# Patient Record
Sex: MALE | Employment: OTHER | URBAN - METROPOLITAN AREA
[De-identification: names, ages, dates, MRNs, and addresses within clinical notes are randomized per-mention and may not be internally consistent; named-entity substitution may affect disease eponyms.]

---

## 2023-08-07 ENCOUNTER — OFFICE VISIT (OUTPATIENT)
Dept: INTERNAL MEDICINE CLINIC | Facility: CLINIC | Age: 68
End: 2023-08-07
Payer: MEDICAID

## 2023-08-07 ENCOUNTER — LAB ENCOUNTER (OUTPATIENT)
Dept: LAB | Facility: REFERENCE LAB | Age: 68
End: 2023-08-07
Attending: FAMILY MEDICINE
Payer: MEDICAID

## 2023-08-07 VITALS
BODY MASS INDEX: 25.23 KG/M2 | TEMPERATURE: 98 F | OXYGEN SATURATION: 99 % | SYSTOLIC BLOOD PRESSURE: 122 MMHG | HEIGHT: 63.78 IN | HEART RATE: 62 BPM | DIASTOLIC BLOOD PRESSURE: 78 MMHG | WEIGHT: 146 LBS

## 2023-08-07 DIAGNOSIS — Z00.00 HEALTHCARE MAINTENANCE: ICD-10-CM

## 2023-08-07 DIAGNOSIS — E78.2 MIXED HYPERLIPIDEMIA: ICD-10-CM

## 2023-08-07 DIAGNOSIS — E11.9 TYPE 2 DIABETES MELLITUS WITHOUT COMPLICATION, WITHOUT LONG-TERM CURRENT USE OF INSULIN (HCC): ICD-10-CM

## 2023-08-07 DIAGNOSIS — R41.3 MEMORY CHANGES: ICD-10-CM

## 2023-08-07 DIAGNOSIS — G40.909 SEIZURE DISORDER (HCC): ICD-10-CM

## 2023-08-07 DIAGNOSIS — Z12.11 COLON CANCER SCREENING: ICD-10-CM

## 2023-08-07 DIAGNOSIS — L29.9 PRURITUS: ICD-10-CM

## 2023-08-07 DIAGNOSIS — I10 ESSENTIAL HYPERTENSION: Primary | ICD-10-CM

## 2023-08-07 DIAGNOSIS — I10 ESSENTIAL HYPERTENSION: ICD-10-CM

## 2023-08-07 DIAGNOSIS — Z12.5 PROSTATE CANCER SCREENING: ICD-10-CM

## 2023-08-07 LAB
ALBUMIN SERPL-MCNC: 3.8 G/DL (ref 3.4–5)
ALBUMIN/GLOB SERPL: 0.9 {RATIO} (ref 1–2)
ALP LIVER SERPL-CCNC: 64 U/L
ALT SERPL-CCNC: 19 U/L
ANION GAP SERPL CALC-SCNC: 5 MMOL/L (ref 0–18)
AST SERPL-CCNC: 10 U/L (ref 15–37)
BASOPHILS # BLD AUTO: 0.03 X10(3) UL (ref 0–0.2)
BASOPHILS NFR BLD AUTO: 0.4 %
BILIRUB SERPL-MCNC: 0.3 MG/DL (ref 0.1–2)
BILIRUB UR QL: NEGATIVE
BUN BLD-MCNC: 21 MG/DL (ref 7–18)
BUN/CREAT SERPL: 17.6 (ref 10–20)
CALCIUM BLD-MCNC: 9.4 MG/DL (ref 8.5–10.1)
CHLORIDE SERPL-SCNC: 107 MMOL/L (ref 98–112)
CHOLEST SERPL-MCNC: 205 MG/DL (ref ?–200)
CLARITY UR: CLEAR
CO2 SERPL-SCNC: 29 MMOL/L (ref 21–32)
COLOR UR: YELLOW
COMPLEXED PSA SERPL-MCNC: 5.24 NG/ML (ref ?–4)
CREAT BLD-MCNC: 1.19 MG/DL
CREAT UR-SCNC: 263 MG/DL
DEPRECATED RDW RBC AUTO: 43.8 FL (ref 35.1–46.3)
EGFRCR SERPLBLD CKD-EPI 2021: 67 ML/MIN/1.73M2 (ref 60–?)
EOSINOPHIL # BLD AUTO: 0.42 X10(3) UL (ref 0–0.7)
EOSINOPHIL NFR BLD AUTO: 5.1 %
ERYTHROCYTE [DISTWIDTH] IN BLOOD BY AUTOMATED COUNT: 12.5 % (ref 11–15)
EST. AVERAGE GLUCOSE BLD GHB EST-MCNC: 217 MG/DL (ref 68–126)
FASTING PATIENT LIPID ANSWER: YES
FASTING STATUS PATIENT QL REPORTED: YES
FOLATE SERPL-MCNC: 17.2 NG/ML (ref 8.7–?)
GLOBULIN PLAS-MCNC: 4.1 G/DL (ref 2.8–4.4)
GLUCOSE BLD-MCNC: 134 MG/DL (ref 70–99)
GLUCOSE UR-MCNC: 30 MG/DL
HBA1C MFR BLD: 9.2 % (ref ?–5.7)
HCT VFR BLD AUTO: 42.3 %
HDLC SERPL-MCNC: 43 MG/DL (ref 40–59)
HGB BLD-MCNC: 13.9 G/DL
HGB UR QL STRIP.AUTO: NEGATIVE
IMM GRANULOCYTES # BLD AUTO: 0.04 X10(3) UL (ref 0–1)
IMM GRANULOCYTES NFR BLD: 0.5 %
KETONES UR-MCNC: NEGATIVE MG/DL
LDLC SERPL CALC-MCNC: 140 MG/DL (ref ?–100)
LEUKOCYTE ESTERASE UR QL STRIP.AUTO: NEGATIVE
LYMPHOCYTES # BLD AUTO: 2.1 X10(3) UL (ref 1–4)
LYMPHOCYTES NFR BLD AUTO: 25.5 %
MCH RBC QN AUTO: 31 PG (ref 26–34)
MCHC RBC AUTO-ENTMCNC: 32.9 G/DL (ref 31–37)
MCV RBC AUTO: 94.4 FL
MICROALBUMIN UR-MCNC: 8.08 MG/DL
MICROALBUMIN/CREAT 24H UR-RTO: 30.7 UG/MG (ref ?–30)
MONOCYTES # BLD AUTO: 0.59 X10(3) UL (ref 0.1–1)
MONOCYTES NFR BLD AUTO: 7.2 %
NEUTROPHILS # BLD AUTO: 5.05 X10 (3) UL (ref 1.5–7.7)
NEUTROPHILS # BLD AUTO: 5.05 X10(3) UL (ref 1.5–7.7)
NEUTROPHILS NFR BLD AUTO: 61.3 %
NITRITE UR QL STRIP.AUTO: NEGATIVE
NONHDLC SERPL-MCNC: 162 MG/DL (ref ?–130)
OSMOLALITY SERPL CALC.SUM OF ELEC: 297 MOSM/KG (ref 275–295)
PH UR: 5 [PH] (ref 5–8)
PLATELET # BLD AUTO: 271 10(3)UL (ref 150–450)
POTASSIUM SERPL-SCNC: 4.5 MMOL/L (ref 3.5–5.1)
PROT SERPL-MCNC: 7.9 G/DL (ref 6.4–8.2)
PROT UR-MCNC: 20 MG/DL
RBC # BLD AUTO: 4.48 X10(6)UL
SODIUM SERPL-SCNC: 141 MMOL/L (ref 136–145)
SP GR UR STRIP: 1.02 (ref 1–1.03)
TRIGL SERPL-MCNC: 124 MG/DL (ref 30–149)
UROBILINOGEN UR STRIP-ACNC: NORMAL
VIT B12 SERPL-MCNC: 292 PG/ML (ref 193–986)
VIT D+METAB SERPL-MCNC: 21.2 NG/ML (ref 30–100)
VLDLC SERPL CALC-MCNC: 23 MG/DL (ref 0–30)
WBC # BLD AUTO: 8.2 X10(3) UL (ref 4–11)

## 2023-08-07 PROCEDURE — 36415 COLL VENOUS BLD VENIPUNCTURE: CPT

## 2023-08-07 PROCEDURE — 80061 LIPID PANEL: CPT

## 2023-08-07 PROCEDURE — 3061F NEG MICROALBUMINURIA REV: CPT | Performed by: INTERNAL MEDICINE

## 2023-08-07 PROCEDURE — 82306 VITAMIN D 25 HYDROXY: CPT

## 2023-08-07 PROCEDURE — 3046F HEMOGLOBIN A1C LEVEL >9.0%: CPT | Performed by: INTERNAL MEDICINE

## 2023-08-07 PROCEDURE — 82607 VITAMIN B-12: CPT

## 2023-08-07 PROCEDURE — 86780 TREPONEMA PALLIDUM: CPT

## 2023-08-07 PROCEDURE — 85025 COMPLETE CBC W/AUTO DIFF WBC: CPT | Performed by: FAMILY MEDICINE

## 2023-08-07 PROCEDURE — 81001 URINALYSIS AUTO W/SCOPE: CPT

## 2023-08-07 PROCEDURE — 80053 COMPREHEN METABOLIC PANEL: CPT

## 2023-08-07 PROCEDURE — 3060F POS MICROALBUMINURIA REV: CPT | Performed by: INTERNAL MEDICINE

## 2023-08-07 PROCEDURE — 80177 DRUG SCRN QUAN LEVETIRACETAM: CPT

## 2023-08-07 PROCEDURE — 82043 UR ALBUMIN QUANTITATIVE: CPT

## 2023-08-07 PROCEDURE — 83036 HEMOGLOBIN GLYCOSYLATED A1C: CPT

## 2023-08-07 PROCEDURE — 82746 ASSAY OF FOLIC ACID SERUM: CPT

## 2023-08-07 PROCEDURE — 82570 ASSAY OF URINE CREATININE: CPT

## 2023-08-07 RX ORDER — GLIPIZIDE 10 MG/1
10 TABLET ORAL
COMMUNITY

## 2023-08-07 RX ORDER — LEVETIRACETAM 500 MG/1
500 TABLET ORAL 2 TIMES DAILY
COMMUNITY

## 2023-08-07 RX ORDER — ENALAPRIL MALEATE 20 MG/1
20 TABLET ORAL DAILY
COMMUNITY

## 2023-08-07 RX ORDER — LEVOCETIRIZINE DIHYDROCHLORIDE 5 MG/1
5 TABLET, FILM COATED ORAL EVERY EVENING
Qty: 14 TABLET | Refills: 0 | Status: SHIPPED | OUTPATIENT
Start: 2023-08-07

## 2023-08-07 RX ORDER — ATORVASTATIN CALCIUM 40 MG/1
40 TABLET, FILM COATED ORAL DAILY
COMMUNITY

## 2023-08-07 RX ORDER — HYDROCHLOROTHIAZIDE 12.5 MG/1
12.5 CAPSULE, GELATIN COATED ORAL DAILY
COMMUNITY

## 2023-08-08 ENCOUNTER — TELEPHONE (OUTPATIENT)
Dept: INTERNAL MEDICINE CLINIC | Facility: CLINIC | Age: 68
End: 2023-08-08

## 2023-08-08 DIAGNOSIS — G40.909 SEIZURE DISORDER (HCC): Primary | ICD-10-CM

## 2023-08-08 NOTE — TELEPHONE ENCOUNTER
[employer] message sent to pt/ family to inform them that not all of the lab test results are final yet, hence Dr Dai Weems cannot report results until all results are available for her review. Advised to call the office back in a week if they have not heard back re: test results by then.

## 2023-08-09 LAB
LEVETIRACETAM LVL: <2 UG/ML
T PALLIDUM AB SER QL: NEGATIVE

## 2023-08-14 ENCOUNTER — TELEPHONE (OUTPATIENT)
Dept: INTERNAL MEDICINE CLINIC | Facility: CLINIC | Age: 68
End: 2023-08-14

## 2023-08-14 NOTE — TELEPHONE ENCOUNTER
Daughter is calling stating the medication Levocetirizine needs a prior authorization       Please advice

## 2023-08-18 NOTE — TELEPHONE ENCOUNTER
I spoke with pt's daughter Romi Agarwal. Reviewed and discussed med is available OTC. No PA needed, can get generic form. Notified other refills have already been sent in. To let us know of any issues. She is agreeable with plan.

## 2023-08-21 NOTE — TELEPHONE ENCOUNTER
Daughter is calling stating patient needs prior approval for all medications except for atovastatin.        Please advice
Spoke to daughter, pharmacy states they just need Pt's new insurance card. They already have 2 of the medications ready for him.
English

## 2023-09-12 ENCOUNTER — HOSPITAL ENCOUNTER (OUTPATIENT)
Dept: GENERAL RADIOLOGY | Facility: HOSPITAL | Age: 68
Discharge: HOME | End: 2023-09-12
Attending: INTERNAL MEDICINE
Payer: MEDICAID

## 2023-09-12 ENCOUNTER — OFFICE VISIT (OUTPATIENT)
Dept: INTERNAL MEDICINE CLINIC | Facility: CLINIC | Age: 68
End: 2023-09-12
Payer: MEDICAID

## 2023-09-12 VITALS
HEART RATE: 71 BPM | WEIGHT: 147 LBS | OXYGEN SATURATION: 98 % | TEMPERATURE: 97 F | SYSTOLIC BLOOD PRESSURE: 146 MMHG | BODY MASS INDEX: 25 KG/M2 | DIASTOLIC BLOOD PRESSURE: 70 MMHG

## 2023-09-12 DIAGNOSIS — B02.9 HERPES ZOSTER WITHOUT COMPLICATION: ICD-10-CM

## 2023-09-12 DIAGNOSIS — M79.604 RIGHT LEG PAIN: Primary | ICD-10-CM

## 2023-09-12 DIAGNOSIS — M79.604 RIGHT LEG PAIN: ICD-10-CM

## 2023-09-12 PROCEDURE — 3078F DIAST BP <80 MM HG: CPT | Performed by: INTERNAL MEDICINE

## 2023-09-12 PROCEDURE — 3077F SYST BP >= 140 MM HG: CPT | Performed by: INTERNAL MEDICINE

## 2023-09-12 PROCEDURE — 99214 OFFICE O/P EST MOD 30 MIN: CPT | Performed by: INTERNAL MEDICINE

## 2023-09-12 PROCEDURE — 73590 X-RAY EXAM OF LOWER LEG: CPT | Performed by: INTERNAL MEDICINE

## 2023-09-12 RX ORDER — TRAMADOL HYDROCHLORIDE 50 MG/1
50 TABLET ORAL EVERY 6 HOURS PRN
Qty: 30 TABLET | Refills: 1 | Status: SHIPPED | OUTPATIENT
Start: 2023-09-12

## 2023-09-12 RX ORDER — ACYCLOVIR 800 MG/1
800 TABLET ORAL 4 TIMES DAILY
Qty: 28 TABLET | Refills: 0 | Status: SHIPPED | OUTPATIENT
Start: 2023-09-12

## 2023-09-15 NOTE — TELEPHONE ENCOUNTER
I spoke with pt's daughter. Said pt has been compliant with acyclovir and has been taking tramadol at least 2 times daily. Tramadol helps manage pain for a couple hours, then pt reports pain comes back. He is taking when pain is \"unbearable\". Yobany Sanders reports pain is stable - R leg, below knee. Reports pt is c/o that he is having pain \"a little bit\" above knee. Rash is not spreading but reports pain is where rash is at. Believes pt is able to sleep. Reviewed and discussed PVD - progressive over time. Has been asymptomatic. Denies leg pain prior to shingles onset. Discussed can be in other arteries, should f/u with Dr. Neena Mcqueen. Confirmed with Dr. Kari Rojo, ok to increase tramadol to 2 tabs q 8 hrs. I spoke with Yobany Sanders, notified of med change. Again reviewed pt has PVD. Yobany Sanders reports pt has sun and gets exhausted with exertion. Ongoing since the summer, has to stop activity. Per spouse pt turned pale once and had to sit for extended period of time. Unable to walk a whole block, was previously able to. Symptoms go back to baseline at rest. Denies edema. I asked if pt checks bp at home. Yobany Sanders said SBP was 180. I advised to have pt check bp daily or every other day and keep log. To call back mid next week with bp update with readings. Pt scheduled for f/u 10/10/23. Aware I will send message to MD. We will f/u with any further recommendations prior to appt. ED precautions discussed.

## 2023-09-15 NOTE — TELEPHONE ENCOUNTER
Daughter is calling asking xray results. She states patient continues having left leg pain. She states the medication that was given is not working. Daughter states he is fine for two hours and then starts complaining again about the pain.        Please advice

## 2023-09-18 NOTE — TELEPHONE ENCOUNTER
SEWELL was not endorsed at last OV. Has upcoming appt on 10/10 however we can move up to an earlier SDA. Please provide ER precautions.  Let me know the BP log

## 2023-09-20 NOTE — TELEPHONE ENCOUNTER
Patients daughter  is requesting an appeal, patient's insurance is only authorizing 4 prescriptions per month, please advise

## 2023-09-25 NOTE — TELEPHONE ENCOUNTER
Detailed messages ( english/ Uzbek) sent to patients daughter via [employer] explaining the 4 drug limit policy within the traditional [employer] plan and option she an take to change to managed medicaid plan to avoid all the PA  form completion/ delay issues likely to be encountered month after month given patient has 8 Rxs, 4 over the limit. Also explained that some of her issues ( per [facility], was trying to refill Rxs too soon) and then that not all Rxs were being filled at same pharmacy. Advised that when an Rx is specifically denied a refill for the 4 drug limit, she needs to request the pharmacy to send a separate PA form for each denied drug to Dr Spann Rule office. Triage team will await faxed receipt of PA forms when/ if necessary.

## 2023-09-29 NOTE — PATIENT INSTRUCTIONS
Recommendations:  - call to schedule CT of abdomen and pelvis  - work on incorporating meals throughout the day   - get cardiac testing prior to colonoscopy      1. Schedule colonoscopy with Dr. Elsa Owen or Dr. Ladonna Blair with [facility] [Diagnosis: crc screening, weight loss]    2.  bowel prep from pharmacy (split golytely)    3. Hold metformin and glipizide day before and day of procedure. 4. Read all bowel prep instructions carefully. Bowel prep instructions can also be found online at:  www.[facility].org/giprep     5. AVOID seeds, nuts, popcorn, raw fruits and vegetables for 3 days before procedure    6. You MAY need to go for COVID testing 72 hours before procedure. The testing team will call you a few days before your procedure to discuss with you if testing is required. If you are asked to go for COVID testing and do not completed the test, the procedure cannot be performed. 7. If you start any NEW medication after your visit today, please notify us. Certain medications (like iron or weight loss medications) will need to be held before the procedure, or the procedure cannot be performed safely.

## 2023-09-29 NOTE — H&P
[facility], [facility] - Gastroenterology                                                                                                               Reason for consult: Patient presents with:  Consult: Colonoscopy      Requesting physician or provider: Savannah Ladd MD      HPI:   Sommer Mcqueen is a 76year old year-old male with history of HTN, HLD, DM, seizure who presents for crc screening. he moves his bowels 3  times per day and without recent change. He states stools can be extremely soft over the past month. denies straining and/or incomplete evacuation. he denies brbpr and/or melena. He notes decreased appetite over the last two months. Weight loss in last 2 months. He is unsure for the cause of symptoms, he notes no abdominal pain or GERD. he denies dysphagia, odynophagia and/or globus. he denies abdominal pain. he denies nausea and/or vomiting. he denies bloating. NSAIDS: PRN  Tobacco: none  Alcohol: none  Marijuana: none  Illicit drugs: none    FH GI malignancy- pancreatic cancer sister   FH celiac dz- none  FH liver dz- none  FH IBD- none    No history of adverse reaction to sedation  No KELLY  No anticoagulants  No pacemaker/defibrillator  No pain medications and/or sleep aides      Last colonoscopy: none  Last EGD: none    Wt Readings from Last 6 Encounters:  09/29/23 : 142 lb (64.4 kg)  09/29/23 : 142 lb (64.4 kg)  09/19/23 : 143 lb (64.9 kg)  09/12/23 : 147 lb (66.7 kg)  08/07/23 : 146 lb (66.2 kg)       History, Medications, Allergies, ROS:      Past Medical History:   Diagnosis Date    Diabetes ([facility] [address]     Essential hypertension     High cholesterol     Seizure ([facility] [address]       History reviewed. No pertinent surgical history.    Family Hx:   Family History   Problem Relation Age of Onset    Diabetes Mother     Diabetes Sister     Pancreatic Cancer Sister     Diabetes Brother     Blood Cancer Sister       Social History:   Social History     Socioeconomic History    Marital status:    Tobacco Use    Smoking status: Never    Smokeless tobacco: Never   Substance and Sexual Activity    Alcohol use: Not Currently    Drug use: Never        Medications (Active prior to today's visit):  Current Outpatient Medications   Medication Sig Dispense Refill    Cholecalciferol (VITAMIN D) 50 MCG (2000 UT) Oral Cap Take by mouth.      gabapentin 100 MG Oral Cap Take 1 capsule (100 mg total) by mouth 2 (two) times daily as needed (nerve pain). 60 capsule 0    atorvastatin 40 MG Oral Tab Take 1 tablet (40 mg total) by mouth daily. 90 tablet 1    enalapril 20 MG Oral Tab Take 1 tablet (20 mg total) by mouth daily. 90 tablet 1    glipiZIDE 10 MG Oral Tab Take 1 tablet (10 mg total) by mouth 2 (two) times daily before meals. 180 tablet 1    hydroCHLOROthiazide 12.5 MG Oral Cap Take 1 capsule (12.5 mg total) by mouth daily. 90 capsule 1    metFORMIN HCl 1000 MG Oral Tab Take 1 tablet (1,000 mg total) by mouth 2 (two) times daily with meals. 180 tablet 1    levETIRAcetam 500 MG Oral Tab Take 1 tablet (500 mg total) by mouth 2 (two) times daily. 180 tablet 3    acyclovir 800 MG Oral Tab Take 1 tablet (800 mg total) by mouth 4 (four) times daily. (Patient not taking: Reported on 9/29/2023) 28 tablet 0    traMADol 50 MG Oral Tab Take 1 tablet (50 mg total) by mouth every 6 (six) hours as needed for Pain.  (Patient not taking: Reported on 9/29/2023) 30 tablet 1       Allergies:  No Known Allergies    ROS:   CONSTITUTIONAL: negative for fevers, chills, sweats and weight loss  EYES Negative for red eyes, yellow eyes, changes in vision  HEENT: Negative for dysphagia and hoarseness  RESPIRATORY: Negative for cough and shortness of breath  CARDIOVASCULAR: Negative for chest pain, palpitations  GASTROINTESTINAL: See HPI  GENITOURINARY: Negative for dysuria and frequency  MUSCULOSKELETAL: Negative for arthralgias and myalgias  NEUROLOGICAL: Negative for dizziness and headaches  BEHAVIOR/PSYCH: Negative for anxiety and poor appetite    PHYSICAL EXAM:   Blood pressure 152/69, pulse 76, height 5' 3\" (1.6 m), weight 142 lb (64.4 kg). GEN: WD/WN, NAD  HEENT: Supple symmetrical, trachea midline  CV: RRR, the extremities are warm and well perfused   LUNGS: No increased work of breathing  ABDOMEN: No scars, normal bowel sounds, soft, non-tender, non-distended no rebound or guarding, no masses, no hepatomegaly  MSK: No redness, no warmth, no swelling of joints  SKIN: No jaundice, no erythema, no rashes  HEMATOLOGIC: No bleeding, no bruising  NEURO: Alert and interactive, normal gait    Labs/Imaging/Procedures:     Patient's pertinent labs and imaging were reviewed and discussed with patient today. Lab Results   Component Value Date    WBC 8.2 08/07/2023    RBC 4.48 08/07/2023    HGB 13.9 08/07/2023    HCT 42.3 08/07/2023    MCV 94.4 08/07/2023    MCH 31.0 08/07/2023    MCHC 32.9 08/07/2023    RDW 12.5 08/07/2023    .0 08/07/2023        Lab Results   Component Value Date     (H) 08/07/2023    BUN 21 (H) 08/07/2023    BUNCREA 17.6 08/07/2023    CREATSERUM 1.19 08/07/2023    ANIONGAP 5 08/07/2023    CA 9.4 08/07/2023    OSMOCALC 297 (H) 08/07/2023    ALKPHO 64 08/07/2023    AST 10 (L) 08/07/2023    ALT 19 08/07/2023    BILT 0.3 08/07/2023    TP 7.9 08/07/2023    ALB 3.8 08/07/2023    GLOBULIN 4.1 08/07/2023     08/07/2023    K 4.5 08/07/2023     08/07/2023    CO2 29.0 08/07/2023          . ASSESSMENT/PLAN:   William Whitmore is a 76year old year-old male with history of HTN, HLD, DM, seizure who presents for crc screening. #crc screening  Patient presents for crc screening. Bowel movements daily. Often super soft. No recent change. No brbpr or melena. Eating small amount of food throughout the day due to lack of appetite. He denies abdominal pain, GERD, nausea and vomiting. Sister history of pancreatic cancer. No family hx of colon cancer. Discussed need for cln at this time. Due to weight loss, decreased appetite and family hx of pancreatic cancer plan for ct a/p to evaluate for pancreatic cause. Most recent CMP within normal. No anemia noted on labs. No tenderness on exam.     Patient agreeable to plan. Recommendations:  - call to schedule CT of abdomen and pelvis  - work on incorporating meals throughout the day   - get cardiac testing prior to colonoscopy      1. Schedule colonoscopy with Dr. Elsa Owen or Dr. Ladonna Blair with [facility] [Diagnosis: crc screening, weight loss]    2.  bowel prep from pharmacy (split golytely)    3. Hold metformin and glipizide day before and day of procedure. 4. Read all bowel prep instructions carefully. Bowel prep instructions can also be found online at:  [email or web address]     5. AVOID seeds, nuts, popcorn, raw fruits and vegetables for 3 days before procedure    6. You MAY need to go for COVID testing 72 hours before procedure. The testing team will call you a few days before your procedure to discuss with you if testing is required. If you are asked to go for COVID testing and do not completed the test, the procedure cannot be performed. 7. If you start any NEW medication after your visit today, please notify us. Certain medications (like iron or weight loss medications) will need to be held before the procedure, or the procedure cannot be performed safely. Orders This Visit:  No orders of the defined types were placed in this encounter. Meds This Visit:  Requested Prescriptions      No prescriptions requested or ordered in this encounter       Imaging & Referrals:  CT ABDOMEN+PELVIS(ALL W+WO)(CPT=74178)    ENDOSCOPIC RISK BENEFIT DISCUSSION: I described the procedure in great detail with the patient. I discussed the risks and benefits, including but not limited to: bleeding, perforation, infection, anesthesia complications, and even death.  Patient will be NPO after midnight and will have a person physically present at time of pick-up to drive patient home. Patient verbalized understanding and agrees to proceed with procedure as planned. Pan Guidry PA-C   9/29/2023        This note was partially prepared using "[employer] voice recognition dictation software. As a result, errors may occur. When identified, these errors have been corrected.  While every attempt is made to correct errors during dictation, discrepancies may still exist.

## 2023-09-29 NOTE — TELEPHONE ENCOUNTER
Scheduled for:  Colonoscopy 40963/[id number]  Provider Name:  Dr Toney Valiente  Date:  412/2024  Location:  [facility]  Sedation:  [facility]  Time:  8:15 am. (pt is aware to arrive at 7:15 am)  Prep:  Split dose Golytely  Meds/Allergies Reconciled?:  CLARISSA Doll reviewed  Diagnosis with codes:    CRC screening Z12.11  Weight loss R63.4  Was patient informed to call insurance with codes (Y/N):  Yes  Referral sent?:  Referral was sent at the time of electronic surgical scheduling. [address] [facility] or [facility]  notified?:  I sent an electronic request to Endo Scheduling and received a confirmation today. Medication Orders:  Pt is aware to NOT take iron pills, herbal meds and diet supplements for 7 days before exam.   Hold metformin and glipizide day before and day of procedure   Also to NOT take any form of alcohol, recreational drugs and any forms of ED meds 24 hours before exam.   Misc Orders:  N/A   Further instructions given by staff:  I discussed the prep instructions with the patient which he verbally understood. I provided patient with prep instruction's sheet in office. Patient was informed about the new cancellation policy for his/her procedure. Patient was also given a copy of the cancellation policy at the time of the appointment and verbalized understanding.

## 2023-09-29 NOTE — PROGRESS NOTES
Neurology Initial Visit     Referred By: Dr. Deshpande ref. provider found    Chief Complaint: Patient presents with:  Neurologic Problem: New patient presents with seizure disorders. Patient stop taking Keppra for one week because while he was taking Gabapentin it was making him feel fatigue, dizzy and fainting sensation. Since he hasn't been taking Keppra he feels a lot better Patient wife states that the Venora Cobble makes him sleep an additional 5 hours in the day because  he is tired. HPI:     Ana Rausch is a 76year old male, who presents for history of seizure. Back in 2021 patient had a syncopal convulsive event. He was admitted to outside hospital, apparently EEG was done and that showed left temporal sharp wave activity, CT of the head reportedly also was done and that was not revealing. Patient was placed on Keppra. He was tolerating it well, no more events. But apparently the dose was slowly decreased. They switched care to our clinic in September 2023 since it was difficult to get a hold of those neurologist.  Patient also was describing a lot of snoring and hypersomnia during daytime. Additionally some cognitive changes, forgetting of recent events conversations. He is not working, he would have mistakes while driving. Also history of shingles rash and pain. Some itchiness over the scalp. .     Past Medical History:   Diagnosis Date    Diabetes ([facility])     Essential hypertension     High cholesterol     Seizure ([facility])        No past surgical history on file. Social history:    Smoking status:   Never      Smokeless tobacco:   Never       Alcohol use   Not Currently       Drug use:   Unknown       Family History   Problem Relation Age of Onset    Diabetes Mother     Diabetes Sister     Pancreatic Cancer Sister     Diabetes Brother     Blood Cancer Sister          Current Outpatient Medications:     levETIRAcetam 500 MG Oral Tab, Take 1 tablet (500 mg total) by mouth 2 (two) times daily. , Disp: 180 tablet, Rfl: 3    Cholecalciferol (VITAMIN D) 50 MCG (2000 UT) Oral Cap, Take by mouth., Disp: , Rfl:     gabapentin 100 MG Oral Cap, Take 1 capsule (100 mg total) by mouth 2 (two) times daily as needed (nerve pain). , Disp: 60 capsule, Rfl: 0    acyclovir 800 MG Oral Tab, Take 1 tablet (800 mg total) by mouth 4 (four) times daily. (Patient not taking: Reported on 9/29/2023), Disp: 28 tablet, Rfl: 0    traMADol 50 MG Oral Tab, Take 1 tablet (50 mg total) by mouth every 6 (six) hours as needed for Pain. (Patient not taking: Reported on 9/29/2023), Disp: 30 tablet, Rfl: 1    atorvastatin 40 MG Oral Tab, Take 1 tablet (40 mg total) by mouth daily. , Disp: 90 tablet, Rfl: 1    enalapril 20 MG Oral Tab, Take 1 tablet (20 mg total) by mouth daily. , Disp: 90 tablet, Rfl: 1    glipiZIDE 10 MG Oral Tab, Take 1 tablet (10 mg total) by mouth 2 (two) times daily before meals. , Disp: 180 tablet, Rfl: 1    hydroCHLOROthiazide 12.5 MG Oral Cap, Take 1 capsule (12.5 mg total) by mouth daily. , Disp: 90 capsule, Rfl: 1    metFORMIN HCl 1000 MG Oral Tab, Take 1 tablet (1,000 mg total) by mouth 2 (two) times daily with meals. , Disp: 180 tablet, Rfl: 1    No Known Allergies    ROS:   As in HPI, the rest of the 14 system review was done and was negative      Physical Exam:   09/29/23  0956   Weight: 142 lb (64.4 kg)   Height: 63\"       General: No apparent distress, well nourished, well groomed. Head- Normocephalic, atraumatic  Eyes- No redness or swelling  ENT- Hearing intake, normal glutition  Neck- No masses or adenopathy  Cv: pulses were palpable and normal, no cyanosis or edema     Neurological:     Mental Status- Alert unable to assess fully due to language barrier.     Rapid alternating movements intact    Gait:  Normal posture  Normal physiologic      Labs:    No results found for: \"TSH\"  Lab Results   Component Value Date    HDL 43 08/07/2023     (H) 08/07/2023    TRIG 124 08/07/2023     Lab Results   Component Value Date    HGB 13.9 08/07/2023    HCT 42.3 08/07/2023    MCV 94.4 08/07/2023    WBC 8.2 08/07/2023    .0 08/07/2023      Lab Results   Component Value Date    BUN 21 (H) 08/07/2023    CA 9.4 08/07/2023    ALT 19 08/07/2023    AST 10 (L) 08/07/2023    ALB 3.8 08/07/2023     08/07/2023    K 4.5 08/07/2023     08/07/2023    CO2 29.0 08/07/2023      I have reviewed labs. I have reviewed prior records from the outside hospital      Assessment   1. Seizure disorder ([address]  Episode of 1 episode of seizure, with abnormal EEG report with left temporal sharp wave activity. Unclear if it is anything had anything to do with her COVID vaccination. Possible epilepsy. We will do MRI since it is not clear if MRI was ever done. EEG will be repeated. The meantime Keppra will be continued since there was already abnormal EEG report. - MRI BRAIN (W+WO) (CPT=70553); Future  - EEG (At [facility])    2. Hypersomnia  Some of his hypersomnia is probably from the obstructive sleep apnea, sleep study will be done. - DIAGOSTIC SLEEP STUDY  - General sleep study; Future    3. MCI (mild cognitive impairment) with memory loss  Cognitive impairment with amnestic subtype, further work-up will be initiated including neuropsychological testing.    - levETIRAcetam 500 MG Oral Tab; Take 1 tablet (500 mg total) by mouth 2 (two) times daily. Dispense: 180 tablet; Refill: 3  - MRI BRAIN (W+WO) (CPT=70553); Future  - EEG (At [facility] sleep study; Future  - NEURO - EXTERNAL  - NEURO - EXTERNAL  -  NAVIGATOR  - T Pallidum Screening [place]; Future  - TSH W Reflex To Free T4; Future  - Vitamin B12; Future           Education and counseling provided to patient. Instructed patient to call my office or seek medical attention immediately if symptoms worsen. Patient verbalized understanding of information given. All questions were answered.  All side effects of drugs were discussed. Return to clinic in: Return in about 3 months (around 12/29/2023).     Jcarlos Scott MD

## 2023-10-02 NOTE — TELEPHONE ENCOUNTER
----- Message from Cleavon Dubin, MD sent at 9/30/2023  7:43 AM CDT -----  Please let the patient know that results of these particular lab tests so far were normal.    Thank you      Vitamin B12   TSH W Reflex To Free T4

## 2023-10-16 NOTE — TELEPHONE ENCOUNTER
Spoke with daughter Michelle Jett and relayed PCP Message. Daughter states that she will follow up with GI regarding CT results.

## 2023-10-16 NOTE — TELEPHONE ENCOUNTER
----- Message from Christina Snider MD sent at 10/16/2023 12:50 PM CDT -----  Please let patient know that EEG was normal.

## 2023-10-16 NOTE — PROCEDURES
EEG report    REFERRING PHYSICIAN: Cullen Downey MD    PCP and phone number:  Dalton Perez MD  [phone]    TECHNIQUE: 21 channels of EEG, 2 channels of EOG, and 1 channel of EKG were recorded utilizing the International 10/20 System. The recording was performed in a digitized monopolar referential format and playback was reformatted into various referential and bipolar montages utilizing appropriate filter settings. Automatic seizure and spike detection programs were utilized throughout the recording. Video was recorded during the study    CLINICAL DATA:  Patient is sent for the evaluation of possible seizures. MEDICATION:  Continuous Medications:      Scheduled Medications:    Current Outpatient Medications:     Cholecalciferol (VITAMIN D) 50 MCG (2000 UT) Oral Cap, Take by mouth., Disp: , Rfl:     levETIRAcetam 500 MG Oral Tab, Take 1 tablet (500 mg total) by mouth 2 (two) times daily. , Disp: 180 tablet, Rfl: 3    polyethylene glycol, PEG 3350-KCl-NaBcb-NaCl-NaSulf, 236 g Oral Recon Soln, Take 4,000 mL by mouth As Directed. Take 2,000 mL the night before your procedure and 2,000 mL the morning of your procedure., Disp: 1 each, Rfl: 0    gabapentin 100 MG Oral Cap, Take 1 capsule (100 mg total) by mouth 2 (two) times daily as needed (nerve pain). , Disp: 60 capsule, Rfl: 0    acyclovir 800 MG Oral Tab, Take 1 tablet (800 mg total) by mouth 4 (four) times daily. (Patient not taking: Reported on 9/29/2023), Disp: 28 tablet, Rfl: 0    traMADol 50 MG Oral Tab, Take 1 tablet (50 mg total) by mouth every 6 (six) hours as needed for Pain. (Patient not taking: Reported on 9/29/2023), Disp: 30 tablet, Rfl: 1    atorvastatin 40 MG Oral Tab, Take 1 tablet (40 mg total) by mouth daily. , Disp: 90 tablet, Rfl: 1    enalapril 20 MG Oral Tab, Take 1 tablet (20 mg total) by mouth daily. , Disp: 90 tablet, Rfl: 1    glipiZIDE 10 MG Oral Tab, Take 1 tablet (10 mg total) by mouth 2 (two) times daily before meals. , Disp: 180 tablet, Rfl: 1    hydroCHLOROthiazide 12.5 MG Oral Cap, Take 1 capsule (12.5 mg total) by mouth daily. , Disp: 90 capsule, Rfl: 1    metFORMIN HCl 1000 MG Oral Tab, Take 1 tablet (1,000 mg total) by mouth 2 (two) times daily with meals. , Disp: 180 tablet, Rfl: 1    PRN Medications:      ACTIVATION:  Hyperventilation: Not done  Photic stimulation: Done, no abnormalities  Sleep: Normal sleep architecture was seen. BACKGROUND  While the patient was awake, the posterior dominant rhythm consisted of well-regulated 9-10 Hz rhythmic waveforms, symmetrically distributed over both posterior quadrants and was reactive to eye opening. EEG ABNORMALITY  None    IMPRESSION:  This is a normal EEG. No focal, lateralized, or epileptiform features are noted. Clinical correlation required.

## 2023-10-17 NOTE — TELEPHONE ENCOUNTER
Called and talked with patient's daughter about test results. Abnormal kidney lesion noted on CT advised for ultrasound to better evaluate the lesion. Rickie Canard bladder also contracted in CT, will have ultrasound look at this as well. Pancreas normal. No other acute abdominal findings. Patient's daughter states that he is still not eating and continues to lose weight. Discussed small frequent meals and repeat imaging at this time. EGD/cln not until April. Patient to schedule follow up with me.      Sanya Lux PA-C

## 2023-10-19 NOTE — TELEPHONE ENCOUNTER
Daughter of pt is calling requesting a referral for urology. Did not find anything in kidney and need to find out if is cancer.

## 2023-10-19 NOTE — TELEPHONE ENCOUNTER
Called and talked with daughter. Confirmed patient's .     Ultrasound demonstrated a lesion on kidney with concern for renal cell carcinoma. Reviewed with daughter will need further work up and I will send message to PCP to assist with getting him set up with Urology.     Gall stones noted on exam, but negative don sign. Discussed monitoring for worsening pain and to contact the office.         Dr. Lobo Ashley- Patient with renal mass suspicious for renal cell carcinoma on CT and ultrasound. Wanted to see if you could assist with a urology referral at this time.     Thank you     Katja

## 2023-10-19 NOTE — TELEPHONE ENCOUNTER
Ultrasound results suggest patient follows up with Urology.  Let me know if referral can be placed, thanks

## 2023-10-19 NOTE — TELEPHONE ENCOUNTER
Impression   CONCLUSION:  There is a 2.3 cm partially exophytic mass of the lateral left mid kidney, corresponding to a soft tissue density mass on CT. This finding is concerning for a renal cell carcinoma. Recommend urology consultation/follow-up. Multiple bilateral renal simple and minimally complicated cysts elsewhere. Nonobstructing 4 mm left lower pole renal stone. Cholelithiasis.           Dictated by (CST): Rajeev Dyer MD on 10/19/2023 at 9:25 AM      Finalized by (CST): Rajeev Dyer MD on 10/19/2023 at 9:37 AM

## 2023-10-21 NOTE — TELEPHONE ENCOUNTER
Order for urology placed.  Please let family know that if anyble to get appt with any of the providers in the next 2wks to notify our office in order to assist

## 2023-10-30 NOTE — DISCHARGE INSTRUCTIONS
Please discuss your diabetes medications with your physician as they likely will need to be decreased. Please ensure adequate nutrition intake while on these medications.

## 2023-10-30 NOTE — ED INITIAL ASSESSMENT (HPI)
Pt presents to ED for dizziness and a posterior headache that he woke up with today around 0930. Denies N/V. States he feels better now. Tylenol taken at 10am.     After triage completed pt wife reports that the pt started feeling weak, disorientation and not making sense that started at midnight. Wife states the last time she saw him normal was at 7pm last night. Stroke alert called in triage at 1118.

## 2023-11-01 NOTE — TELEPHONE ENCOUNTER
Daughter calling states can't get MRI until 12/4 asking can MD do anything to get it done sooner then that because pt is to return on 11/22 for his f/u please advise

## 2023-11-01 NOTE — TELEPHONE ENCOUNTER
Please advise if ok to wait for patient to wait for first available for MRI of abdomen/pelvis.      Future Appointments   Date Time Provider Cortez Lopez   11/11/2023 12:30 PM [address] [place] [address] [facility]T [facility]   11/16/2023 10:00 AM Loyd Snider MD [facility]   11/22/2023  9:45 AM Lisa Norris MD [facility] OF THE [facility]   12/4/2023  3:15 PM ADO MRI RM1 (1.5T) ADO MRI EM [place]   12/21/2023  9:45 AM Balaji Mendieta MD [facility] OF THE [facility]   1/2/2024  2:45 PM Mirela Vann MD [address] [place] [address] OF THE [facility]   4/12/2024  8:15 AM [facility] None

## 2023-11-01 NOTE — PROGRESS NOTES
Kirby Mckeon MD  Department of Urology  [facility], [place]    T: [phone]  F: [phone]    To: Harry Lopez MD   [facility] [address]    Re: Angeles Castro   MRN: [id number]  : 1955    Dear Harry Lopez MD,    Today I had the pleasure of seeing Angeles Castro in my clinic. As you know, Mr. Aparna Davis is a pleasant 76year old year old male who I am seeing for renal mass. Patient was last seen in this department on Visit date not found. Briefly, patient recently went to the emergency room with dizziness and headache. She had a CT abdomen pelvis on 10/14/2023 for decreased appetite. A 2.8 cm left interpolar renal lesion and a 1.7 cm left upper pole renal lesion and a right lower pole renal lesion measuring 56 Hounsfield units was seen. They are incompletely characterized. Follow-up ultrasound was obtained that showed a cyst in the left lower kidney and a cyst in the upper left pole but a 2.3 cm exophytic lesion of the mid kidney that is concerning for malignancy. PAST MEDICAL HISTORY:  Past Medical History:   Diagnosis Date    Diabetes ([facility]     Essential hypertension     High cholesterol     Seizure ([facility]         PAST SURGICAL HISTORY:  No past surgical history on file.       ALLERGIES:  No Known Allergies      MEDICATIONS:  Current Outpatient Medications   Medication Instructions    acyclovir (ZOVIRAX) 800 mg, Oral, 4 times daily    atorvastatin (LIPITOR) 40 mg, Oral, Daily    Cholecalciferol (VITAMIN D) 50 MCG ( UT) Oral Cap Oral    enalapril (VASOTEC) 20 mg, Oral, Daily    gabapentin (NEURONTIN) 100 mg, Oral, 2 times daily PRN    glipiZIDE (GLUCOTROL) 10 mg, Oral, 2 times daily before meals    hydroCHLOROthiazide (MICROZIDE) 12.5 mg, Oral, Daily    levETIRAcetam (KEPPRA) 500 mg, Oral, 2 times daily    metFORMIN HCl (GLUCOPHAGE) 1,000 mg, Oral, 2 times daily with meals polyethylene glycol, PEG 3350-KCl-NaBcb-NaCl-NaSulf, 236 g Oral Recon Soln 4,000 mL, Oral, As Directed, Take 2,000 mL the night before your procedure and 2,000 mL the morning of your procedure. traMADol (ULTRAM) 50 mg, Oral, Every 6 hours PRN        FAMILY HISTORY:  Family History   Problem Relation Age of Onset    Diabetes Mother     Diabetes Sister     Pancreatic Cancer Sister     Diabetes Brother     Blood Cancer Sister         SOCIAL HISTORY:  Social History     Socioeconomic History    Marital status:    Tobacco Use    Smoking status: Never    Smokeless tobacco: Never   Substance and Sexual Activity    Alcohol use: Not Currently    Drug use: Never          PHYSICAL EXAMINATION:  There were no vitals filed for this visit. CONSTITUTIONAL: No apparent distress, cooperative and communicative  NEUROLOGIC: Alert and oriented   HEAD: Normocephalic, atraumatic   EYES: Sclera non-icteric   ENT: Hearing intact, moist mucous membranes   NECK: No obvious goiter or masses   RESPIRATORY: Normal respiratory effort, Nonlabored breathing on room air  SKIN: No evident rashes   ABDOMEN: Soft, nontender, nondistended, no rebound tenderness, no guarding, no masses      REVIEW OF SYSTEMS:    A comprehensive 10-point review of systems was completed. Pertinent positives and negatives are noted in the the HPI.        LABORATORY DATA:  Glucose  70 - 99 mg/dL 43 Low Panic    Sodium  136 - 145 mmol/L 142   Potassium  3.5 - 5.1 mmol/L 4.4   Chloride  98 - 112 mmol/L 108   CO2  21.0 - 32.0 mmol/L 28.0   Anion Gap  0 - 18 mmol/L 6   BUN  7 - 18 mg/dL 25 High    Creatinine  0.70 - 1.30 mg/dL 1.04   BUN/CREA Ratio  10.0 - 20.0 24.0 High    Calcium, Total  8.5 - 10.1 mg/dL 9.5   Calculated Osmolality  275 - 295 mOsm/kg 295   eGFR-Cr  >=60 mL/min/1.73m2 78   ALT  16 - 61 U/L 17   AST  15 - 37 U/L 13 Low    Alkaline Phosphatase  45 - 117 U/L 49   Bilirubin, Total  0.1 - 2.0 mg/dL 0.4   Total Protein  6.4 - 8.2 g/dL 7.6   Albumin  3.4 - 5.0 g/dL 3.7   Globulin  2.8 - 4.4 g/dL 3.9   A/G Ratio  1.0 - 2.0 0.9 Low      WBC  4.0 - 11.0 x10(3) uL 9.3   RBC  3.80 - 5.80 x10(6)uL 4.03   HGB  13.0 - 17.5 g/dL 12.8 Low    HCT  39.0 - 53.0 % 37.6 Low    MCV  80.0 - 100.0 fL 93.3   MCH  26.0 - 34.0 pg 31.8   MCHC  31.0 - 37.0 g/dL 34.0   RDW-SD  35.1 - 46.3 fL 45.5   RDW  11.0 - 15.0 % 13.2   PLT  150.0 - 450.0 10(3)uL 292.0   Neutrophil Absolute Prelim  1.50 - 7.70 x10 (3) uL 6.31   Neutrophil Absolute  1.50 - 7.70 x10(3) uL 6.31   Lymphocyte Absolute  1.00 - 4.00 x10(3) uL 2.03   Monocyte Absolute  0.10 - 1.00 x10(3) uL 0.58   Eosinophil Absolute  0.00 - 0.70 x10(3) uL 0.29   Basophil Absolute  0.00 - 0.20 x10(3) uL 0.03   Immature Granulocyte Absolute  0.00 - 1.00 x10(3) uL 0.04   Neutrophil %  % 68.0         Component  Ref Range & Units 8/7/23 12:16 PM   Urine Color  Yellow Yellow   Clarity Urine  Clear Clear   Spec Gravity  1.005 - 1.030 1.024   Glucose Urine  Normal mg/dL 30 Abnormal    Bilirubin Urine  Negative Negative   Ketones Urine  Negative mg/dL Negative   Blood Urine  Negative Negative   pH Urine  5.0 - 8.0 5.0   Protein Urine  Negative, Trace mg/dL 20 Abnormal    Urobilinogen Urine  Normal Normal   Nitrite Urine  Negative Negative   Leukocyte Esterase Urine  Negative Negative   WBC Urine  0 - 5 /HPF 1-5   RBC Urine  0 - 2 /HPF 0-2   Bacteria Urine  None Seen /HPF Rare Abnormal    Squamous Epi. Cells  None Seen /HPF Few Abnormal          Component  Ref Range & Units 8/7/23 12:16 PM   Prostate Specific Antigen Screen  <=4.00 ng/mL 5.24 High            IMAGING REVIEW:  Narrative   PROCEDURE: CT ABDOMEN + PELVIS (CONTRAST ONLY) (CPT=74177)     COMPARISON: None. INDICATIONS: R63.0 Decreased appetite R63.4 Weight loss and diarrhea, denies any pain. TECHNIQUE: CT images of the abdomen and pelvis were obtained with non-ionic intravenous contrast material.  Automated exposure control for dose reduction was used.  Adjustment of the mA and/or kV was done based on the patient's size. Use of iterative  reconstruction technique for dose reduction was used. Dose information is transmitted to the [facility]) NRDR ([address]) which includes the Dose Index Registry. FINDINGS:  Evaluation is slightly degraded by patient-related motion artifact. LIVER: Normal.  BILIARY: Contracted gallbladder with probable gallstones. No evidence for cholecystitis or biliary dilatation. SPLEEN: Normal.    PANCREAS: Normal.    ADRENALS: Normal.  KIDNEYS: A 2.8 cm lateral left interpolar renal lesion measuring between 60 and 69 Hounsfield units and a 1.7 cm left upper pole renal lesion which measures between 60 and 72 Hounsfield units. A exophytic right lower pole renal lesion measuring 56  Hounsfield units. A simple 2.6 cm left upper pole renal cyst, a simple exophytic 2 cm left lower pole renal cyst and few additional simple smaller renal cysts. Right upper pole renal cortical scar. A 2 mm nonobstructing left lower pole renal calculus. AORTA/VASCULAR: Irregular atherosclerotic plaque in the aorta without aneurysm or dissection. Generalized atherosclerosis. Major aortic branches patent. No aneurysm or dissection. LYMPHADENOPATHY: None. GI/MESENTERY: Normal appendix. Colonic diverticulosis. Large amount of stool in the colon. Nonspecific increase in small bowel fluid. No obstruction, bowel wall thickening, or mesenteric mass. ABDOMINAL WALL: Small fat containing left inguinal hernia. URINARY BLADDER: Mild mural thickening of urinary bladder probably related to combination of underdistention and chronic bladder outlet obstruction. ASCITES:   None. PELVIC ORGANS: Moderate prostate  BONES: No suspect bone lesion. LUNG BASES: Minimal dependent atelectasis. A punctate calcification along the right hemidiaphragm at the interface with the right middle lobe. OTHER: Negative.                  Impression CONCLUSION:  1. A left upper pole renal lesion, a left interpolar renal lesion, and a right lower pole renal lesion which are incompletely characterized and may represent solid masses, or complex cysts. In view of the patient related motion artifact, patient is  probably not a good candidate for MRI. Suggest follow-up ultrasound for further characterization. 2. Colonic diverticulosis. Large amount of stool in the colon suggests constipation. Nonspecific increase in small bowel fluid probably related to stasis. 3. Contracted gallbladder with probable gallstones. 4. Generalized atherosclerosis. 5. Moderate prostate enlargement with mild mural thickening of urinary bladder probably related to combination of underdistention and chronic bladder outlet obstruction. Dictated by (CST): Varinder Stevens MD on 10/14/2023 at 4:31 PM      Finalized by (CST): Varinder Stevens MD on 10/14/2023 at 4:45 PM           Narrative   PROCEDURE: US ABDOMEN COMPLETE (CPT=76700)     COMPARISON: [facility], CT ABDOMEN + PELVIS (CONTRAST ONLY) (CPT=74177), 10/14/2023, 9:48 AM.     INDICATIONS: Abnormal CT of the abdomen     TECHNIQUE:   The abdomen was evaluated with grayscale and colorflow of the main vessels. FINDINGS:  LIVER: Measures 15.6 cm. No significant masses. Color flow and Doppler imaging of portal and hepatic veins show patency and antegrade flow. GALLBLADDER: Gallstones are noted. No wall thickening or pericholecystic fluid. Sonographic Hicks's sign was not elicited. BILE DUCTS: Normal.  Common bile duct measures 4 mm. PANCREAS: Normal appearance. SPLEEN: Normal.  Normal size and echotexture. Spleen length measures 8.0 cm. Limited assessment secondary to limited sonographic window. KIDNEYS: No hydronephrosis or obstructing renal stone. The right kidney length measures 11.2 cm. The left kidney length measures 11.7 cm.     There is a 1.1 x 1 x 1.1 cm nearly anechoic, avascular lesion with increased through transmission of right lower kidney. This is favored to represent a cyst, which may be minimally complicated. There is a 1.9 x 1.9 x 2.1 cm partially exophytic nearly anechoic, avascular mass of the left lower kidney with increased through transmission. This is favored to represent a cyst which may be minimally complicated. There is a 1.9 x 2.3 x 2.0 cm partially exophytic lesion of the left mid kidney. This is hypoechoic with peripheral vascularity. There is a 1.9 x 2.1 x 1.5 cm lesion of the medial left upper kidney, which is anechoic and demonstrate increased through transmission. This is compatible with a cyst which demonstrated fluid density on CT. There is a nonobstructing 4 x 2 x 2 mm left lower renal stone. AORTA/VASCULAR: Normal.  No aneurysm. Patent IVC. OTHER: Negative. No ascites or adenopathy seen. Impression   CONCLUSION:  There is a 2.3 cm partially exophytic mass of the lateral left mid kidney, corresponding to a soft tissue density mass on CT. This finding is concerning for a renal cell carcinoma. Recommend urology consultation/follow-up. Multiple bilateral renal simple and minimally complicated cysts elsewhere. Nonobstructing 4 mm left lower pole renal stone. Cholelithiasis. Dictated by (CST): Ana Laura Beltran MD on 10/19/2023 at 9:25 AM      Finalized by (CST): Ana Laura Beltran MD on 10/19/2023 at 9:37 AM          OTHER RELEVANT DATA:   none     IMPRESSION: 1. Indeterminate left interpolar renal mass-recommend MRI renal mass protocol as long as patient is able to remain still versus CT renal mass protocol. Patient states that he has no issues staying still and so we will proceed with MRI renal mass protocol    2.   Elevated PSA to 5.24-Recommended repeat PSA    We discussed reasons for PSA elevation including enlarged prostate, \"prostate jostling\", recent ejaculation x72 hours prior to PSA collection, UTI and malignancy. Discussed risks of biopsy which include bleeding (hematospermia, hematochezia and hematuria), infection, sepsis, temporary erectile dysfunction, pelvic pain and possible death from infectious complications. He knows that MRI is not a direct substitute for biopsy and even if negative does not rule out malignancy. It would help with targeting lesions during biopsy or potentially delaying biopsy pending psas. IT is a decision making tool. PLAN:  MRI renal mass protocol  PSA  Return to clinic after MRI and PSA    Thank you for referring this very pleasant patient to my clinic. If you have any questions or concerns, please do not hesitate to contact me. Sincerely,  Leslie Christopher MD    30 minutes were spent on this patient at this visit obtaining a history, reviewing medical records, developing a treatment plan, counseling and discussing treatment strategy with patient, coordination of care and documentation. The [id number] makes medical notes available to patients in the interest of transparency. However, please be advised that this is a medical document. It is intended as a peer to peer communication. It is written in medical language and may contain abbreviations or verbiage that are technical and unfamiliar. It may appear blunt or direct. Medical documents are intended to carry relevant information, facts as evident, and the clinical opinion of the practitioner.

## 2023-11-01 NOTE — TELEPHONE ENCOUNTER
Patient's Daughter called with a question regarding Patient's MRI. He was in the Hospital and had an MRI this past Monday  (Dr. Willard León did see him)  Question is does he still need to have the Brain MRI that he had   previously scheduled for 11/11 or can they cancel it?

## 2023-11-02 NOTE — TELEPHONE ENCOUNTER
Spoke with patients daughter regarding below. She confirmed and verbalized understanding.      Todd Tee MD  You21 hours ago (12:26 PM)     Let them know to call again later this week and go on a waitlist    If the have to wait until 12/4, we should move his appt out until after mri

## 2023-12-15 NOTE — PROGRESS NOTES
Fox Quintanilla MD  Department of Urology  [facility].[address], [place] Elias    T: [phone]  F: [phone]    To: Edvin Neal MD   [address] [place] [address] [place] [address]    Re: Princess Flores   MRN: [id number]  : 1955    Dear Edvin Neal MD,    Today I had the pleasure of seeing Princess Flores in my clinic. As you know, Mr. Deborah Castaneda is a pleasant 76year old year old male who I am seeing for follow up. Patient was last seen in this department on 2023. Briefly, patient recently went to the emergency room with dizziness and headache. He  had a CT abdomen pelvis on 10/14/2023 for decreased appetite. A 2.8 cm left interpolar renal lesion and a 1.7 cm left upper pole renal lesion and a right lower pole renal lesion measuring 56 Hounsfield units was seen. They are incompletely characterized. Follow-up ultrasound was obtained that showed a cyst in the left lower kidney and a cyst in the upper left pole but a 2.3 cm exophytic lesion of the mid kidney that is concerning for malignancy. Plan at last visit was to obtain an MRI renal mass protocol. I also recommended a repeat PSA given his elevated PSA to 5.24. We obtained an MRI of the abdomen that demonstrated no solid renal lesion. He did have a Bosniak 2 hemorrhagic cyst.  He did have a Bosniak 2F cyst that was 1.1 cm in the left lower pole. His PSA repeat was 2.90. PAST MEDICAL HISTORY:  Past Medical History:   Diagnosis Date    Diabetes ([facility] [address]     Essential hypertension     High cholesterol     Seizure ([facility] [address]         PAST SURGICAL HISTORY:  No past surgical history on file.       ALLERGIES:  No Known Allergies      MEDICATIONS:  Current Outpatient Medications   Medication Instructions    atorvastatin (LIPITOR) 40 mg, Oral, Daily    Cholecalciferol (VITAMIN D) 50 MCG (2000) Oral Cap Oral    enalapril (VASOTEC) 20 mg, Oral, Daily    gabapentin (NEURONTIN) 100 mg, Oral, 2 times daily PRN    hydroCHLOROthiazide (MICROZIDE) 12.5 mg, Oral, Daily    levETIRAcetam (KEPPRA) 500 mg, Oral, 2 times daily    metFORMIN HCl (GLUCOPHAGE) 1,000 mg, Oral, 2 times daily with meals    polyethylene glycol, PEG 3350-KCl-NaBcb-NaCl-NaSulf, 236 g Oral Recon Soln 4,000 mL, Oral, As Directed, Take 2,000 mL the night before your procedure and 2,000 mL the morning of your procedure. FAMILY HISTORY:  Family History   Problem Relation Age of Onset    Diabetes Mother     Diabetes Sister     Pancreatic Cancer Sister     Diabetes Brother     Blood Cancer Sister         SOCIAL HISTORY:  Social History     Socioeconomic History    Marital status:    Tobacco Use    Smoking status: Never    Smokeless tobacco: Never   Substance and Sexual Activity    Alcohol use: Not Currently    Drug use: Never          PHYSICAL EXAMINATION:  There were no vitals filed for this visit. CONSTITUTIONAL: No apparent distress, cooperative and communicative  NEUROLOGIC: Alert and oriented   HEAD: Normocephalic, atraumatic   EYES: Sclera non-icteric   ENT: Hearing intact, moist mucous membranes   NECK: No obvious goiter or masses   RESPIRATORY: Normal respiratory effort, Nonlabored breathing on room air  SKIN: No evident rashes   ABDOMEN: Soft, nontender, nondistended, no rebound tenderness, no guarding, no masses      REVIEW OF SYSTEMS:    A comprehensive 10-point review of systems was completed. Pertinent positives and negatives are noted in the the HPI.        LABORATORY DATA:      Component  Ref Range & Units 11/30/23 10:58 AM   Total PSA  <=4.00 ng/mL 2.90              IMAGING REVIEW:  Narrative   PROCEDURE: MRI ABDOMEN (W+WO) (ZGB=[id number])     COMPARISON: [facility], [facility]. for [facility], [facility] John[place] Chorophilakis (YYP=[id number]), 10/19/2023, 7:19 AM.  [facility], CT ABDOMEN + PELVIS (CONTRAST ONLY) (CPT=74177), 10/14/2023, 9:48 AM.     INDICATIONS: N28.89 Renal mass     TECHNIQUE: A comprehensive MRI examination of the abdomen was performed utilizing a variety of imaging planes and imaging parameters to optimize visualization of suspected pathology. Images were obtained both before and after intravenous gadolinium  injection. FINDINGS:  KIDNEYS: Multiple bilateral hemorrhagic renal cysts (ease Bosniak type 2). The partially exophytic left lateral interpolar renal lesion which was indeterminate on CT scan, and suspicious on ultrasound represents a 2.5 cm Bosniak 2 hemorrhagic cyst seen  on image 55 series 18, and series 11. There is a 1.1 cm anterior left lower pole partially exophytic lesion (image 68 -71 series 11) with a small amount of hemorrhage in its dependent portion, and few mildly thickened septa (Bosniak type 2 F) image 60 series 18. There is a partially exophytic 1.2 cm hemorrhagic right lower pole renal lesion image 68 series 11 with few minimally thickened septa on subtraction image 69 series 1400. Lauren Blunt In the posteromedial right lower pole there is a 9 mm lesion containing  hemorrhage and slight irregularity with the interface with the normal parenchyma which may be secondary to motion between the subtraction images and the other sequences. No unequivocal solid renal lesion. LIVER: Normal.    GALLBLADDER: Gallstones. No evidence of cholecystitis  BILIARY TREE: Normal.    PANCREAS: Normal.    SPLEEN: Normal.  A 1 cm accessory splenule left upper quadrant. ADRENALS: Normal.    AORTA & MAJOR BRANCHES: Irregular atherosclerotic plaque in the aorta and some of its branches. No aneurysm. VENOUS SYSTEM: Major intra-abdominal veins patent. LYMPHADENOPATHY: None. ASCITES:    None. BONES: Normal.  No bony lesion or fracture. LUNG BASES: Normal.  No significant finding. OTHER: Negative. Impression   CONCLUSION:  1. No solid renal lesion.  Partially exophytic left lateral interpolar renal lesion which was suspicious on ultrasound and indeterminate on CT corresponds with a 2.5 cm Bosniak type 2 hemorrhagic cyst.  Multiple bilateral Bosniak type 2 mildly  complicated bilateral renal cysts. 2. Few small Bosniak type 2 F cysts with mildly thickened septa. Follow-up MRI in 6 months is recommended to re-evaluate the Bosniak type 2 F cysts. 3. Cholelithiasis. 4. Irregular atherosclerotic plaque in the aorta. 5. Fax             Dictated by (CST): Varinder Stevens MD on 12/04/2023 at 11:18 PM      Finalized by (CST): Varinder Stevens MD on 12/05/2023 at 0:10 AM              OTHER RELEVANT DATA:   none     IIMPRESSION: 1. Indeterminate left interpolar renal mass with no obvious renal masses on follow-up MRI. He does have a Bosniak 2F cyst that measures 1.1 cm. Will plan for MRI abdomen in 6 months. 2.  Elevated PSA to 5.24 -repeat PSA returned within the normal value. He should check his PSA annually with his primary care physician per American urologic Association guidelines     PLAN:  MRI abdomen  Return to clinic after MRI abdomen  PSA screening with primary care physician    Thank you for referring this very pleasant patient to my clinic. If you have any questions or concerns, please do not hesitate to contact me. Sincerely,  Danuta Daniel MD    30 minutes were spent on this patient at this visit obtaining a history, reviewing medical records, developing a treatment plan, counseling and discussing treatment strategy with patient, coordination of care and documentation. The [facility] makes medical notes available to patients in the interest of transparency. However, please be advised that this is a medical document. It is intended as a peer to peer communication. It is written in medical language and may contain abbreviations or verbiage that are technical and unfamiliar. It may appear blunt or direct.   Medical documents are intended to carry relevant information, facts as evident, and the clinical opinion of the practitioner.

## 2023-12-21 NOTE — PATIENT INSTRUCTIONS
Overlap lacosamide with Keppra for 1 week, then take Keppra once a day for 1 week, then stop Keppra but continue with lacosamide.

## 2023-12-21 NOTE — PROGRESS NOTES
Neurology Initial Visit     Referred By: Dr. Deshpande ref. provider found    Chief Complaint: No chief complaint on file. HPI:     Chris Lacy is a 76year old male, who presents for history of seizure. Back in 2021 patient had a syncopal convulsive event. He was admitted to outside hospital, apparently EEG was done and that showed left temporal sharp wave activity, CT of the head reportedly also was done and that was not revealing. Patient was placed on Keppra. He was tolerating it well, no more events. But apparently the dose was slowly decreased. They switched care to our clinic in September 2023 since it was difficult to get a hold of that neurologist.  Patient also was describing a lot of snoring and hypersomnia during daytime. Additionally some cognitive changes, forgetting of recent events conversations. He was not working, he would have mistakes while driving. Also history of shingles rash and pain. Some itchiness over the scalp. We continued with Keppra. No more syncopal events. EEG was not revealing. MRI of the brain was pending for November. However in late October 2023 while continues to feel losing weight he was brought to the hospital after not feeling well, since at least midnight night prior to seeing was having difficulty with communication, generally weak, no focal weakness however. Patient was found to be hypoglycemic, 48, after it was corrected he has improved. Following commands normally. MRI was not revealing at that time. Patient came back for follow-up in December 2023. Patient had no more episodes that could be consistent with seizures. However continued to have some cognitive changes as well as significant anxiety and itchiness over the scalp.   Sleep apnea did show some mild sleep apnea, but also significant periodic movement limb disorder    Past Medical History:   Diagnosis Date    Diabetes ([facility] [address]     Essential hypertension     High cholesterol Seizure ([facility] [address]        No past surgical history on file. Social history:  History   Smoking Status    Never   Smokeless Tobacco    Never     History   Alcohol Use Not Currently     History   Drug Use Unknown       Family History   Problem Relation Age of Onset    Diabetes Mother     Diabetes Sister     Pancreatic Cancer Sister     Diabetes Brother     Blood Cancer Sister          Current Outpatient Medications:     Cholecalciferol (VITAMIN D) 50 MCG (2000 UT) Oral Cap, Take by mouth., Disp: , Rfl:     levETIRAcetam 500 MG Oral Tab, Take 1 tablet (500 mg total) by mouth 2 (two) times daily. , Disp: 180 tablet, Rfl: 3    polyethylene glycol, PEG 3350-KCl-NaBcb-NaCl-NaSulf, 236 g Oral Recon Soln, Take 4,000 mL by mouth As Directed. Take 2,000 mL the night before your procedure and 2,000 mL the morning of your procedure. (Patient not taking: Reported on 11/16/2023), Disp: 1 each, Rfl: 0    gabapentin 100 MG Oral Cap, Take 1 capsule (100 mg total) by mouth 2 (two) times daily as needed (nerve pain). , Disp: 60 capsule, Rfl: 0    atorvastatin 40 MG Oral Tab, Take 1 tablet (40 mg total) by mouth daily. , Disp: 90 tablet, Rfl: 1    enalapril 20 MG Oral Tab, Take 1 tablet (20 mg total) by mouth daily. , Disp: 90 tablet, Rfl: 1    hydroCHLOROthiazide 12.5 MG Oral Cap, Take 1 capsule (12.5 mg total) by mouth daily. , Disp: 90 capsule, Rfl: 1    metFORMIN HCl 1000 MG Oral Tab, Take 1 tablet (1,000 mg total) by mouth 2 (two) times daily with meals. , Disp: 180 tablet, Rfl: 1    No Known Allergies    ROS:   As in HPI, the rest of the 14 system review was done and was negative      Physical Exam:  There were no vitals filed for this visit. General: No apparent distress, well nourished, well groomed.   Head- Normocephalic, atraumatic  Eyes- No redness or swelling  ENT- Hearing intake, normal glutition  Neck- No masses or adenopathy  Cv: pulses were palpable and normal, no cyanosis or edema     Neurological:     Mental Status- Alert unable to assess fully due to language barrier. Rapid alternating movements intact    Gait:  Normal posture  Normal physiologic      Labs:    Lab Results   Component Value Date    TSH 2.000 09/29/2023     Lab Results   Component Value Date    HDL 43 08/07/2023     (H) 08/07/2023    TRIG 124 08/07/2023     Lab Results   Component Value Date    HGB 12.8 (L) 10/30/2023    HCT 37.6 (L) 10/30/2023    MCV 93.3 10/30/2023    WBC 9.3 10/30/2023    .0 10/30/2023      Lab Results   Component Value Date    BUN 25 (H) 10/30/2023    CA 9.5 10/30/2023    ALT 17 10/30/2023    AST 13 (L) 10/30/2023    ALB 3.7 10/30/2023     10/30/2023    K 4.4 10/30/2023     10/30/2023    CO2 28.0 10/30/2023      I have reviewed labs. I have reviewed prior records from the outside hospital      Assessment   1. Seizure disorder ([facility] [address]  Episode of 1 episode of seizure, with abnormal EEG report with left temporal sharp wave activity. Unclear if it is anything had anything to do with her COVID vaccination. Possible epilepsy. MRI of the brain was not revealing, however since [address] might be causing some problems with anxiety and mood changes and when he gets angry easily we will try to switch him to Vimpat, we discussed risks of doing it with the family, they were agreeable. But also psychiatry referral will be placed to work with anxiety and depression    2. Hypersomnia  Some of his hypersomnia is probably from the obstructive sleep apnea, although relatively mild. However some PLM also was present and therefore gabapentin will be started at night. If is not successful, then he will go back to stocking for CPAP titration    3. MCI (mild cognitive impairment) with memory loss  Cognitive impairment with amnestic subtype, further work-up will be initiated including neuropsychological testing. Blood work was not revealing. Neuropsychological testing in [place] is still pending. Donepezil will be started. Education and counseling provided to patient. Instructed patient to call my office or seek medical attention immediately if symptoms worsen. Patient verbalized understanding of information given. All questions were answered. All side effects of drugs were discussed. Return to clinic in: No follow-ups on file.     Doreen Nunez MD

## 2024-01-02 NOTE — ASSESSMENT & PLAN NOTE
Diabetes type II: mild background diabetic retinopathy, no signs of neovascularization noted.  No treatment necessary at this time.  Patient was instructed to monitor vision for sudden changes and to call if visual changes noted.  Discussed ocular and systemic benefits of blood sugar control.    Will see patient in 1 year for a diabetic exam

## 2024-01-02 NOTE — PROGRESS NOTES
Felix Gonzales is a 68 year old male.    HPI:     HPI    NP is here for diabetic eye exam.  Pt denies any vision problems.  He is not sure when he last had an eye exam.    Pt has been a diabetic for 20 years       Pt's diabetes is currently controlled by pills  Pt checks BS does not check   Pt's last blood sugar was  43 on 10/30/23  Last HA1C was 6.2 on 11/16/23  Endocrinologist: none    Consult : Per Dr. Lobo Ashley  Last edited by Monique Ugarte O.TEE on 1/2/2024  3:09 PM.        Patient History:  Past Medical History:   Diagnosis Date    Diabetes (HCC)     Essential hypertension     High cholesterol     Seizure (HCC)        Surgical History: Felix Gonzales has no past surgical history on file.    Family History   Problem Relation Age of Onset    Diabetes Mother     Diabetes Sister     Pancreatic Cancer Sister     Blood Cancer Sister     Diabetes Brother     Glaucoma Neg     Macular degeneration Neg        Social History:   Social History     Socioeconomic History    Marital status:    Tobacco Use    Smoking status: Never    Smokeless tobacco: Never   Substance and Sexual Activity    Alcohol use: Not Currently    Drug use: Never       Medications:  Current Outpatient Medications   Medication Sig Dispense Refill    lacosamide (VIMPAT) 100 MG Oral Tab Take 1 tablet (100 mg total) by mouth 2 (two) times daily. 180 tablet 3    gabapentin 300 MG Oral Cap Start with 1 pill QHS, for 1 week, then 2 pills qhs 180 capsule 3    donepezil 5 MG Oral Tab Take 1 tablet (5 mg total) by mouth nightly. 90 tablet 3    Cholecalciferol (VITAMIN D) 50 MCG (2000 UT) Oral Cap Take by mouth.      levETIRAcetam 500 MG Oral Tab Take 1 tablet (500 mg total) by mouth 2 (two) times daily. 180 tablet 3    polyethylene glycol, PEG 3350-KCl-NaBcb-NaCl-NaSulf, 236 g Oral Recon Soln Take 4,000 mL by mouth As Directed. Take 2,000 mL the night before your procedure and 2,000 mL the morning of your procedure. 1 each 0     atorvastatin 40 MG Oral Tab Take 1 tablet (40 mg total) by mouth daily. 90 tablet 1    enalapril 20 MG Oral Tab Take 1 tablet (20 mg total) by mouth daily. 90 tablet 1    hydroCHLOROthiazide 12.5 MG Oral Cap Take 1 capsule (12.5 mg total) by mouth daily. 90 capsule 1    metFORMIN HCl 1000 MG Oral Tab Take 1 tablet (1,000 mg total) by mouth 2 (two) times daily with meals. 180 tablet 1       Allergies:  No Known Allergies    ROS:     ROS    Positive for: Endocrine, Eyes  Negative for: Constitutional, Gastrointestinal, Neurological, Skin, Genitourinary, Musculoskeletal, HENT, Cardiovascular, Respiratory, Psychiatric, Allergic/Imm, Heme/Lymph  Last edited by Monique Ugarte, O.T. on 1/2/2024  2:58 PM.          PHYSICAL EXAM:     Base Eye Exam       Visual Acuity (Snellen - Linear)         Right Left    Dist sc 20/20 -1 20/20    Near sc 20/40 20/40              Tonometry (Icare, 3:07 PM)         Right Left    Pressure 19 20              Pupils         Pupils    Right PERRL    Left PERRL              Visual Fields         Left Right     Full Full              Extraocular Movement         Right Left     Full, Ortho Full, Ortho              Neuro/Psych       Oriented x3: Yes    Mood/Affect: Normal              Dilation       Both eyes: 1.0% Mydriacyl and 2.5% Negro Synephrine @ 3:07 PM                  Slit Lamp and Fundus Exam       Slit Lamp Exam         Right Left    Lids/Lashes Dermatochalasis, Meibomian gland dysfunction Dermatochalasis, Meibomian gland dysfunction    Conjunctiva/Sclera Nasal/temp pinguecula Nasal/temp pinguecula    Cornea Clear Clear    Anterior Chamber Deep and quiet Deep and quiet    Iris Normal Normal    Lens 1-2+ Nuclear sclerosis 1-2+ Nuclear sclerosis    Vitreous Vitreous floaters Vitreous floaters              Fundus Exam         Right Left    Disc Good rim, Temporal crescent Good rim, Temporal crescent    C/D Ratio 0.6 0.5    Macula few MA's above fovea no CSME Normal-no BDR    Vessels  Normal Normal    Periphery Normal Normal                  Refraction       Manifest Refraction (Auto)         Sphere Cylinder Axis    Right -0.25 +0.50 155    Left +0.50 Sphere    Declined refraction. Says he doesn't need them.                    ASSESSMENT/PLAN:     Diagnoses and Plan:     Nonproliferative diabetic retinopathy of right eye (HCC)  Diabetes type II: mild background diabetic retinopathy, no signs of neovascularization noted.  No treatment necessary at this time.  Patient was instructed to monitor vision for sudden changes and to call if visual changes noted.  Discussed ocular and systemic benefits of blood sugar control.    Will see patient in 1 year for a diabetic exam    Age-related nuclear cataract of both eyes  Discussed mild cataracts with patient.  No treatment recommended at this time.       No orders of the defined types were placed in this encounter.      Meds This Visit:  Requested Prescriptions      No prescriptions requested or ordered in this encounter        Follow up instructions:  Return in about 1 year (around 1/2/2025) for Diabetic eye exam.    1/2/2024  Scribed by: Landon Benz MD

## 2024-01-02 NOTE — PATIENT INSTRUCTIONS
Nonproliferative diabetic retinopathy of right eye (HCC)  Diabetes type II: mild background diabetic retinopathy, no signs of neovascularization noted.  No treatment necessary at this time.  Patient was instructed to monitor vision for sudden changes and to call if visual changes noted.  Discussed ocular and systemic benefits of blood sugar control.    Will see patient in 1 year for a diabetic exam    Age-related nuclear cataract of both eyes  Discussed mild cataracts with patient.  No treatment recommended at this time.

## 2024-01-05 NOTE — TELEPHONE ENCOUNTER
From: Felix Cassidysage Gonzales  To: Joseluis Salazar  Sent: 1/5/2024 12:22 PM CST  Subject: Felix Ha     Good afternoon. I have a question about his medication. I know you change his levetiracetam medication for another one to see if his anger issues change. I just want to make sure which medicine is the one you prescribed. I have donepezil and lacosamide medication.

## 2024-02-12 NOTE — TELEPHONE ENCOUNTER
Daughter is calling requesting note from PCP for patients insurance stating Dr. Diamond is patient's PCP.       They want the note in order to select provider as his primary care under his insurance.       Letter provider for patient

## 2024-02-16 NOTE — TELEPHONE ENCOUNTER
Daughter is calling requesting a referral for Dr. Haresh Zaldivar for a follow up visit.     Please advice once referral has been authorized.

## 2024-03-09 NOTE — DISCHARGE INSTRUCTIONS
Thank you for seeking care at [facility] Emergency Department.  You have been seen, evaluated, and treated for COVID-19.   We discussed the results of your workup   Please read the instructions provided   If given prescriptions, take as instructed   Please obtain a COVID vaccination when you are eligible for one.    Remember, your care process does not end after your visit today. Please follow-up with your doctor within 1-2 days for a follow-up check to ensure you are  improving, to see if you need any further evaluation/testing, or to evaluate for any alternate diagnoses.    Please return to the emergency department if you develop any new or worsening symptoms including significant difficulty with breathing, persistent vomiting or diarrhea leading to dehydration, chest pain, swelling, bleeding, extreme weakness/fatigue, feeling very ill, not acting normally, or if you develop any other new or concerning symptoms as these could be signs of more serious medical illness.    We hope you feel better.

## 2024-03-09 NOTE — ED INITIAL ASSESSMENT (HPI)
Patient presents with daughter who is translating.  Patient presents fevers since yesterday.  Cough and congestion    9am  motrin

## 2024-03-10 NOTE — ED PROVIDER NOTES
[facility] Department Note  Patient: Felix Gonzales Age: 68 year old Sex: male      MRN: [id number]  : 1955    Patient Seen in: [facility] Emergency Department    History     Chief Complaint   Patient presents with    Fever     Stated Complaint: fever    History obtained from: Patient's daughter     68-year-old male with a past medical history of hypertension, hyperlipidemia, diabetes,  presenting today for evaluation of 2 days of nasal congestion, rhinorrhea, cough, and low-grade fevers.  Patient's daughter states that he has been taking Motrin at home with no significant improvement of symptoms.  Denies any sore throat or shortness of breath.  States he has pain in his chest and back only when coughing.  No pain at rest.  No nausea, vomiting, diarrhea or abdominal pain.    Review of Systems:  Review of Systems  Positive for stated complaint: fever. Constitutional and vital signs reviewed. All other systems reviewed and negative except as noted above.    Patient History:  Past Medical History:   Diagnosis Date    Diabetes (HCC)     Essential hypertension     High cholesterol     Seizure (HCC)        History reviewed. No pertinent surgical history.     Family History   Problem Relation Age of Onset    Diabetes Mother     Diabetes Sister     Pancreatic Cancer Sister     Blood Cancer Sister     Diabetes Brother     Glaucoma Neg     Macular degeneration Neg        Specific Social Determinants of Health:   Social History     Socioeconomic History    Marital status:    Tobacco Use    Smoking status: Never    Smokeless tobacco: Never   Substance and Sexual Activity    Alcohol use: Not Currently    Drug use: Never           PSFH elements reviewed from today and agreed except as otherwise stated in HPI.    Physical Exam     ED Triage Vitals [24 1216]   /79   Pulse 76   Resp 18   Temp 98.5 °F (36.9 °C)   Temp src Temporal   SpO2 99 %   O2 Device None (Room air)        Current:/57   Pulse 75   Temp 99.3 °F (37.4 °C) (Oral)   Resp 18   Wt 61.2 kg   SpO2 97%   BMI 23.91 kg/m²         Physical Exam  Constitutional:       Appearance: He is well-developed.   HENT:      Head: Normocephalic and atraumatic.      Right Ear: External ear normal.      Left Ear: External ear normal.      Nose: Rhinorrhea present.   Eyes:      Conjunctiva/sclera: Conjunctivae normal.      Pupils: Pupils are equal, round, and reactive to light.   Cardiovascular:      Rate and Rhythm: Normal rate and regular rhythm.      Heart sounds: Normal heart sounds.   Pulmonary:      Effort: Pulmonary effort is normal.      Breath sounds: Normal breath sounds.   Abdominal:      General: Bowel sounds are normal.      Palpations: Abdomen is soft.      Tenderness: There is no abdominal tenderness.   Musculoskeletal:         General: Normal range of motion.      Cervical back: Normal range of motion and neck supple.   Skin:     General: Skin is warm and dry.      Findings: No rash.   Neurological:      General: No focal deficit present.      Mental Status: He is alert and oriented to person, place, and time.      Deep Tendon Reflexes: Reflexes are normal and symmetric.   Psychiatric:         Mood and Affect: Mood normal.         Behavior: Behavior normal.         ED Course   Labs:   Labs Reviewed   SARS-COV-2/FLU A AND B/RSV BY PCR (GENEXPERT) - Abnormal; Notable for the following components:       Result Value    SARS-CoV-2 (COVID-19) - (GeneXpert) Detected (*)     All other components within normal limits    Narrative:     This test is intended for the qualitative detection and differentiation of SARS-CoV-2, influenza A, influenza B, and respiratory syncytial virus (RSV) viral RNA in nasopharyngeal or nares swabs from individuals suspected of respiratory viral infection consistent with COVID-19 by their healthcare provider. Signs and symptoms of respiratory viral infection due to SARS-CoV-2, influenza, and  RSV can be similar.    Test performed using the Xpert Xpress SARS-CoV-2/FLU/RSV (real time RT-PCR)  assay on the GeneXpert instrument, [employer] [place], [place] [address].   This test is being used under the Food and Drug Administration's Emergency Use Authorization.    The authorized Fact Sheet for Healthcare Providers for this assay is available upon request from the laboratory.     Radiology findings:  I personally reviewed the images.   XR CHEST AP PORTABLE  (CPT=71045)    Result Date: 3/9/2024  CONCLUSION:  No acute cardiopulmonary process.    Dictated by (CST): Panchito Orantes MD on 3/09/2024 at 4:40 PM     Finalized by (CST): Panchito Orantes MD on 3/09/2024 at 4:41 PM             Cardiac Monitor: Interpreted by me.   Pulse Readings from Last 1 Encounters:   03/09/24 75           MDM   68-year-old male with past medical history of hypertension, hyperlipidemia, diabetes presenting for evaluation of 2 days of nasal congestion, rhinorrhea, cough and low-grade fevers.    Differential diagnoses considered includes, but is not limited to: Viral syndrome, pneumonia, pleural effusions,     Will obtain the following tests: COVID/flu/RSV panel, chest x-ray  Please see ED course for my independent review of these tests/imaging results.    Initial Medications/Therapeutics administered: Tylenol    Chronic conditions affecting care: Hypertension, hyperlipidemia, diabetes    Workup and medications considered but not ordered: None    Social Determinants of Health that impacted care: None     ED course: COVID-19 positive.  I independently reviewed the chest x-ray images that show no evidence of focal opacity to suggest pneumonia.  Patient with no evidence of respiratory distress.  Remains saturating well on room air.  Vitals within normal limits.  Stable for discharge home at this time with continued supportive care and close PCP follow-up.  Return precautions were discussed and all questions answered.  Patient and patient's  daughter expressed understanding and agreement with this plan.      Disposition and Plan     Clinical Impression:  1. COVID-19        Disposition:  Discharge    Follow-up:  Janet Zamorano MD  [address] [facility] [address]  [facility] [address]  [phone]    Schedule an appointment as soon as possible for a visit in 2 day(s)  As needed, If symptoms worsen      Medications Prescribed:  Discharge Medication List as of 3/9/2024  5:21 PM        START taking these medications    Details   guaiFENesin  MG Oral Tablet 12 Hr Take 2 tablets (1,200 mg total) by mouth 2 (two) times daily for 7 days., Normal, Disp-28 tablet, R-0               This note may have been created using voice dictation technology and may include inadvertent errors.      Cassandra Julien MD  Emergency Medicine

## 2024-03-13 NOTE — PROGRESS NOTES
HPI:     Chief Complaint   Patient presents with    Diabetes       Felix Gonzales is a 68 year old male presenting for:    Seizure d/o and MCI-> seeing neuro. Stable; plan for neuropsych testing.    HTN/HLD-> asymptmatic    DM-> asymptomatic    Plan for c-scope due to weight loss and poor appetite in 4/2024. Has some difficulty swallowing solids    Currently seeing uro for renal mass. Plan on repeat MRI in 6mo    Recently dx with COVID on 3/9. Overall improving. No fevers, SOB/CP. URI sx resolved except for lingering mild cough    Results for orders placed or performed in visit on 03/13/24   POC Glycohemoglobin [94670]   Result Value Ref Range    HEMOGLOBIN A1C 6.9 (A) 4.3 - 5.6 %    Cartridge Lot#  Numeric    Cartridge Expiration Date 11/30/2025 Date       Labs:   Lab Results   Component Value Date/Time    A1C 6.9 (A) 03/13/2024 10:35 AM      Lab Results   Component Value Date/Time    CHOLEST 205 (H) 08/07/2023 12:16 PM    HDL 43 08/07/2023 12:16 PM    TRIG 124 08/07/2023 12:16 PM     (H) 08/07/2023 12:16 PM    NONHDLC 162 (H) 08/07/2023 12:16 PM       Lab Results   Component Value Date/Time    GLU 43 (LL) 10/30/2023 11:32 AM     10/30/2023 11:32 AM    K 4.4 10/30/2023 11:32 AM     10/30/2023 11:32 AM    CO2 28.0 10/30/2023 11:32 AM    CREATSERUM 1.04 10/30/2023 11:32 AM    CA 9.5 10/30/2023 11:32 AM    ALB 3.7 10/30/2023 11:32 AM    TP 7.6 10/30/2023 11:32 AM    ALKPHO 49 10/30/2023 11:32 AM    AST 13 (L) 10/30/2023 11:32 AM    ALT 17 10/30/2023 11:32 AM    BILT 0.4 10/30/2023 11:32 AM    TSH 2.000 09/29/2023 10:36 AM          Medications:  Current Outpatient Medications   Medication Sig Dispense Refill    hydroCHLOROthiazide 12.5 MG Oral Cap Take 1 capsule (12.5 mg total) by mouth daily. 90 capsule 1    metFORMIN HCl 1000 MG Oral Tab Take 1 tablet (1,000 mg total) by mouth 2 (two) times daily with meals. 180 tablet 1    benzonatate 200 MG Oral Cap Take 1 capsule (200 mg total) by  mouth 3 (three) times daily as needed for cough. 20 capsule 0    enalapril 20 MG Oral Tab Take 1 tablet (20 mg total) by mouth daily. 90 tablet 1    atorvastatin 40 MG Oral Tab Take 1 tablet (40 mg total) by mouth daily. 90 tablet 1    lacosamide (VIMPAT) 100 MG Oral Tab Take 1 tablet (100 mg total) by mouth 2 (two) times daily. 180 tablet 3    gabapentin 300 MG Oral Cap Start with 1 pill QHS, for 1 week, then 2 pills qhs 180 capsule 3    donepezil 5 MG Oral Tab Take 1 tablet (5 mg total) by mouth nightly. 90 tablet 3    Cholecalciferol (VITAMIN D) 50 MCG (2000 UT) Oral Cap Take by mouth.      polyethylene glycol, PEG 3350-KCl-NaBcb-NaCl-NaSulf, 236 g Oral Recon Soln Take 4,000 mL by mouth As Directed. Take 2,000 mL the night before your procedure and 2,000 mL the morning of your procedure. (Patient not taking: Reported on 3/13/2024) 1 each 0      Past Medical History:   Diagnosis Date    Diabetes (HCC)     Essential hypertension     High cholesterol     Seizure (HCC)          History reviewed. No pertinent surgical history.  No Known Allergies   Social History:  Social History     Socioeconomic History    Marital status:    Tobacco Use    Smoking status: Never    Smokeless tobacco: Never   Substance and Sexual Activity    Alcohol use: Not Currently    Drug use: Never      Family History:  Family History   Problem Relation Age of Onset    Diabetes Mother     Diabetes Sister     Pancreatic Cancer Sister     Blood Cancer Sister     Diabetes Brother     Glaucoma Neg     Macular degeneration Neg           REVIEW OF SYSTEMS:   Review of Systems   Constitutional:  Negative for chills, fatigue and fever.   HENT:  Positive for trouble swallowing.    Respiratory:  Positive for cough. Negative for shortness of breath.    Cardiovascular:  Negative for chest pain, palpitations and leg swelling.   Gastrointestinal:  Negative for abdominal pain, constipation, diarrhea and vomiting.   Musculoskeletal:  Positive for  arthralgias.   Neurological:  Negative for headaches.            PHYSICAL EXAM:   /60   Pulse 65   Temp 98.4 °F (36.9 °C)   Ht 5' 3\" (1.6 m)   Wt 132 lb (59.9 kg)   SpO2 98%   BMI 23.38 kg/m²  Estimated body mass index is 23.38 kg/m² as calculated from the following:    Height as of this encounter: 5' 3\" (1.6 m).    Weight as of this encounter: 132 lb (59.9 kg).     Wt Readings from Last 3 Encounters:   03/13/24 132 lb (59.9 kg)   03/09/24 135 lb (61.2 kg)   11/16/23 139 lb (63 kg)       Physical Exam  Vitals reviewed.   Constitutional:       General: He is not in acute distress.     Appearance: He is well-developed.   HENT:      Mouth/Throat:      Pharynx: No oropharyngeal exudate or posterior oropharyngeal erythema.   Cardiovascular:      Rate and Rhythm: Normal rate and regular rhythm.      Heart sounds: Normal heart sounds. No murmur heard.  Pulmonary:      Effort: Pulmonary effort is normal. No respiratory distress.      Breath sounds: Normal breath sounds.   Abdominal:      General: Bowel sounds are normal. There is no distension.      Palpations: Abdomen is soft.      Tenderness: There is no abdominal tenderness.   Musculoskeletal:      Right lower leg: No edema.      Left lower leg: No edema.   Neurological:      General: No focal deficit present.             ASSESSMENT AND PLAN:   Patient is a 68 year old male who presents primarily presents for:    Diagnoses and all orders for this visit:    COVID-19  -     benzonatate 200 MG Oral Cap; Take 1 capsule (200 mg total) by mouth 3 (three) times daily as needed for cough.  -improved; VSS  -continue supportive care    Type 2 diabetes mellitus without complication, without long-term current use of insulin ([facility])  -     POC Glycohemoglobin [92565]  -     metFORMIN HCl 1000 MG Oral Tab; Take 1 tablet (1,000 mg total) by mouth 2 (two) times daily with meals.  Diabetic control is well controlled  Last A1c value was 6.9% done 3/13/2024.    Recommendations  are:   Will CPM  Advised weight and diabetic/lipid management with carbohydrate controlled ADA and/or low fat/cholesterol DASH diet and exercise (3 times a week for 30+ minutes each time)  Refer to Ophthalmology annually for routine eye exam  Check feet daily.  Podiatry evaluation prn.      Dysphagia, unspecified type  -     XR VIDEO SWALLOW (CPT=74230); Future  -staff message sent to Dr. Hutson in regards to adding endoscopy to his upcoming c-scope    Seizure disorder (HCC)  -CPM with neuro    Essential hypertension  -     enalapril 20 MG Oral Tab; Take 1 tablet (20 mg total) by mouth daily.  -     hydroCHLOROthiazide 12.5 MG Oral Cap; Take 1 capsule (12.5 mg total) by mouth daily.  -stable; controlled  -continue meds  -watch salt intake, regular exercise, DASH/heart healthy eating  -monitor home BP regularly and maintain log; if elevated reading >160/100 notify Md.    Renal mass  -CPM with uro         Return if symptoms worsen or fail to improve.  Patient indicates understanding of the above recommendations and agrees to the above plan.  Reasurrance and education provided. All questions answered.  Notified to call with any questions, complications, allergies, or worsening or changing symptoms as well as any side effects or complications from the treatments .  Red flags/ ER precautions discussed.    Spent 40 minutes including chart review, reviewing appropriate medical history, evaluating patient, discussing treatment options, counseling and education (diet and exercise), ordering appropriate diagnostic tests and completing documentation.        Meds & Refills for this Visit:  Requested Prescriptions     Signed Prescriptions Disp Refills    enalapril 20 MG Oral Tab 90 tablet 1     Sig: Take 1 tablet (20 mg total) by mouth daily.    benzonatate 200 MG Oral Cap 20 capsule 0     Sig: Take 1 capsule (200 mg total) by mouth 3 (three) times daily as needed for cough.    hydroCHLOROthiazide 12.5 MG Oral Cap 90 capsule 1      Sig: Take 1 capsule (12.5 mg total) by mouth daily.    metFORMIN HCl 1000 MG Oral Tab 180 tablet 1     Sig: Take 1 tablet (1,000 mg total) by mouth 2 (two) times daily with meals.       Orders Placed This Encounter   Procedures    POC Glycohemoglobin [[id number]]       Imaging & Consults:  XR VIDEO SWALLOW (CPT=74230)    Health Maintenance:  Health Maintenance   Topic Date Due    Diabetes Care Dilated Eye Exam  Never done    Colorectal Cancer Screening  Never done    Annual Physical  Never done    Diabetes Care Foot Exam (Annual)  Never done    Zoster Vaccines (2 of 2) 03/29/2023    COVID-19 Vaccine (4 - 2023-24 season) 09/01/2023    Influenza Vaccine (1) 10/01/2023    Diabetes Care A1C  05/16/2024    Diabetes Care: Microalb/Creat Ratio  08/07/2024    Diabetes Care: GFR  10/30/2024    PSA  08/07/2025    Annual Depression Screening  Completed    Fall Risk Screening (Annual)  Completed    Pneumococcal Vaccine: 65+ Years  Completed         Janet Ashley MD

## 2024-03-14 NOTE — TELEPHONE ENCOUNTER
Rx sent.  I thought we had decided at end of visit that he was improving and didn't need it anymore but I went ahead and sent it

## 2024-03-14 NOTE — TELEPHONE ENCOUNTER
Daughter of pt is calling that yesterday in visit doctor had mention was going to send medication for cough.      Please call and advise

## 2024-03-18 NOTE — TELEPHONE ENCOUNTER
Called patient's daughter Crissy - advised that I am confirming with management if we can keep colonoscopy and EGD on 4/12 or if we will have to reschedule - will call her back tomorrow to confirm.

## 2024-03-18 NOTE — TELEPHONE ENCOUNTER
----- Message from Janet Ashley MD sent at 3/17/2024 10:42 PM CDT -----  Regarding: Upcomin c-scope  Hi Dr. Hutson,    This is in regard to our mutual patient with an upcoming c-scope. At my OV this week he endorsed some new worsening dysphagia. I'm ordering a video swallow however wanted to notify you in case an endoscopy should be added to his upcoming procedure.    Thanks,  Janet

## 2024-03-20 NOTE — TELEPHONE ENCOUNTER
Per Felicitas Tafoya so [facility] DOES accept [facility] but referral needs to be faxed over to obtain approval. I was not aware of this but patient is ok to stay on schedule.

## 2024-03-20 NOTE — TELEPHONE ENCOUNTER
I sent a message to Felicitas  [facility].

## 2024-03-20 NOTE — TELEPHONE ENCOUNTER
Called patient's daughter Crissy - confirmed we got approval from management to schedule an EGD with the colonoscopy on 4/12/2024 at [facility]. Please refer to MARYCHUY dated 9/29/2023 for scheduling orders.    Routed encounter to [facility]PD high priority to [facility]   yes

## 2024-03-28 NOTE — PROGRESS NOTES
Neurology follow-up visit     Referred By: Dr. Deshpande ref. provider found    Chief Complaint:   Chief Complaint   Patient presents with    Seizures     LOV 12/21/2023 Patient  presents with seizure disorder. Patient daughter states that patient is taking lacosamide (VIMPAT) 100 MG Oral Tab and patient mood hasn't changed. Patient did get  Neurological test.Patient daughter state that the doctor wanted to see if the patient thyroid level was checked, and vitamin B12 as well as a MRI.       HPI:     Felix Gonzales is a 68 year old male, who presents for history of seizure.  Back in 2021 patient had a syncopal convulsive event.  He was admitted to outside hospital, apparently EEG was done and that showed left temporal sharp wave activity, CT of the head reportedly also was done and that was not revealing.  Patient was placed on Keppra.  He was tolerating it well, no more events.  But apparently the dose was slowly decreased.  They switched care to our clinic in September 2023 since it was difficult to get a hold of that neurologist.  Patient also was describing a lot of snoring and hypersomnia during daytime.  Additionally some cognitive changes, forgetting of recent events conversations.  He was not working, he would have mistakes while driving.  Also history of shingles rash and pain.  Some itchiness over the scalp.    We continued with Keppra.  No more syncopal events.  EEG was not revealing.  MRI of the brain was pending for November.  However in late October 2023 while continues to feel losing weight he was brought to the hospital after not feeling well, since at least midnight night prior to seeing was having difficulty with communication, generally weak, no focal weakness however.  Patient was found to be hypoglycemic, 48, after it was corrected he has improved.  Following commands normally.  MRI was not revealing at that time.    Patient came back for follow-up in December 2023.  Patient had no more  episodes that could be consistent with seizures.  However continued to have some cognitive changes as well as significant anxiety and itchiness over the scalp.  Sleep apnea did show some mild sleep apnea, but also significant periodic movement limb disorder.    At that point in December 2023 due to possible side effects of Keppra we switched him to Vimpat.  Neuropsychological testing was done apparently but there was no report to review in March 2024.  Apparently his psychiatrist switched him from donepezil to memantine.  Also psychiatrist diagnosed him with Alzheimer disease.  However apparently he was not addressing his possible anxiety or depression.    Past Medical History:   Diagnosis Date    Diabetes (HCC)     Essential hypertension     High cholesterol     Seizure (HCC)        No past surgical history on file.    Social history:  History   Smoking Status    Never   Smokeless Tobacco    Never     History   Alcohol Use Not Currently     History   Drug Use Unknown       Family History   Problem Relation Age of Onset    Diabetes Mother     Diabetes Sister     Pancreatic Cancer Sister     Blood Cancer Sister     Diabetes Brother     Glaucoma Neg     Macular degeneration Neg          Current Outpatient Medications:     memantine 5 MG Oral Tab, Take 1 tablet (5 mg total) by mouth daily., Disp: , Rfl:     lacosamide (VIMPAT) 100 MG Oral Tab, Take 1 tablet (100 mg total) by mouth 2 (two) times daily., Disp: 180 tablet, Rfl: 3    hydroCHLOROthiazide 12.5 MG Oral Cap, Take 1 capsule (12.5 mg total) by mouth daily., Disp: 90 capsule, Rfl: 1    metFORMIN HCl 1000 MG Oral Tab, Take 1 tablet (1,000 mg total) by mouth 2 (two) times daily with meals., Disp: 180 tablet, Rfl: 1    benzonatate 200 MG Oral Cap, Take 1 capsule (200 mg total) by mouth 3 (three) times daily as needed for cough., Disp: 20 capsule, Rfl: 0    enalapril 20 MG Oral Tab, Take 1 tablet (20 mg total) by mouth daily., Disp: 90 tablet, Rfl: 1    atorvastatin  40 MG Oral Tab, Take 1 tablet (40 mg total) by mouth daily., Disp: 90 tablet, Rfl: 1    gabapentin 300 MG Oral Cap, Start with 1 pill QHS, for 1 week, then 2 pills qhs, Disp: 180 capsule, Rfl: 3    donepezil 5 MG Oral Tab, Take 1 tablet (5 mg total) by mouth nightly., Disp: 90 tablet, Rfl: 3    Cholecalciferol (VITAMIN D) 50 MCG (2000 UT) Oral Cap, Take by mouth., Disp: , Rfl:     polyethylene glycol, PEG 3350-KCl-NaBcb-NaCl-NaSulf, 236 g Oral Recon Soln, Take 4,000 mL by mouth As Directed. Take 2,000 mL the night before your procedure and 2,000 mL the morning of your procedure. (Patient not taking: Reported on 3/13/2024), Disp: 1 each, Rfl: 0    No Known Allergies    ROS:   As in HPI, the rest of the 14 system review was done and was negative      Physical Exam:  There were no vitals filed for this visit.      General: No apparent distress, well nourished, well groomed.  Head- Normocephalic, atraumatic  Eyes- No redness or swelling  ENT- Hearing intake, normal glutition  Neck- No masses or adenopathy  Cv: pulses were palpable and normal, no cyanosis or edema     Neurological:     Mental Status- Alert unable to assess fully due to language barrier.    Rapid alternating movements intact    Gait:  Normal posture  Normal physiologic      Labs:    Lab Results   Component Value Date    TSH 2.000 09/29/2023     Lab Results   Component Value Date    HDL 43 08/07/2023     (H) 08/07/2023    TRIG 124 08/07/2023     Lab Results   Component Value Date    HGB 12.8 (L) 10/30/2023    HCT 37.6 (L) 10/30/2023    MCV 93.3 10/30/2023    WBC 9.3 10/30/2023    .0 10/30/2023      Lab Results   Component Value Date    BUN 25 (H) 10/30/2023    CA 9.5 10/30/2023    ALT 17 10/30/2023    AST 13 (L) 10/30/2023    ALB 3.7 10/30/2023     10/30/2023    K 4.4 10/30/2023     10/30/2023    CO2 28.0 10/30/2023      I have reviewed labs.    I have reviewed prior records from the outside hospital      Assessment   1. Seizure  disorder (HCC)  Episode of 1 episode of seizure, with abnormal EEG report with left temporal sharp wave activity.  Unclear if it is anything had anything to do with her COVID vaccination.  Possible epilepsy.  MRI of the brain was not revealing, however since Keppra might be causing some problems with anxiety and mood changes and we tried to treat him with Vimpat, however he continued with anxiety and depression.  Advised to follow-up with psychiatrist about that as well.  Will talk about possible different seizure medication but family decided to stick with Vimpat for now..      2. Hypersomnia  Some of his hypersomnia is probably from the obstructive sleep apnea, although relatively mild.  However some PLM also was present and therefore gabapentin will be started at night.  If is not successful, then he will go back to stocking for CPAP titration    3. MCI (mild cognitive impairment) with memory loss  Cognitive impairment with amnestic subtype, further work-up will be initiated including neuropsychological testing.  It seems that the care was taken over by the psychiatrist who is prescribing memantine at this point.  We talked about possible staging of Alzheimer's disease.    Advised to get neuropsychological testing done to get a better assessment.           Education and counseling provided to patient. Instructed patient to call my office or seek medical attention immediately if symptoms worsen.  Patient verbalized understanding of information given. All questions were answered. All side effects of drugs were discussed.       Return to clinic in: No follow-ups on file.    Joseluis Salazar MD

## 2024-04-03 NOTE — TELEPHONE ENCOUNTER
S/w  Jarad # [id number] to Felix son at [phone] who is with the pt. Pt stated has intermittent right lower leg swelling but currently no swelling. Pt denied leg pain but later admitted to mild right lower leg pain. Due to the pt's memory issues the pt is unreliable historian.       Disposition: [facility]    RN advised the son Felix and called the sister Crissy to advise take the pt to  today.     RN informed them if the pt has chest pain, SOB, or severe leg pain to go immediately to ED or call 911. They voiced understanding.      Reason for Disposition   Thigh or calf pain and only 1 side and present > 1 hour    Answer Assessment - Initial Assessment Questions  1. ONSET: \"When did the swelling start?\" (e.g., minutes, hours, days)      Years but had noticed recently for 2 weeks.  2. LOCATION: \"What part of the leg is swollen?\"  \"Are both legs swollen or just one leg?\"      Right   3. SEVERITY: \"How bad is the swelling?\" (e.g., localized; mild, moderate, severe)   - Localized - small area of swelling localized to one leg   - MILD pedal edema - swelling limited to foot and ankle, pitting edema < 1/4 inch (6 mm) deep, rest and elevation eliminate most or all swelling   - MODERATE edema - swelling of lower leg to knee, pitting edema > 1/4 inch (6 mm) deep, rest and elevation only partially reduce swelling   - SEVERE edema - swelling extends above knee, facial or hand swelling present       Pt denied swelling at present but has intermittent swelling. Pt rates swelling of mild.   4. REDNESS: \"Does the swelling look red or infected?\"      Denied  5. PAIN: \"Is the swelling painful to touch?\" If Yes, ask: \"How painful is it?\"   (Scale 1-10; mild, moderate or severe)      Mild pain but not sure  6. FEVER: \"Do you have a fever?\" If Yes, ask: \"What is it, how was it measured, and when did it start?\"       Denied  7. CAUSE: \"What do you think is causing the leg swelling?\"      Unknown   8. MEDICAL  HISTORY: \"Do you have a history of heart failure, kidney disease, liver failure, or cancer?\"      Denied  9. RECURRENT SYMPTOM: \"Have you had leg swelling before?\" If Yes, ask: \"When was the last time?\" \"What happened that time?\"      Denied  10. OTHER SYMPTOMS: \"Do you have any other symptoms?\" (e.g., chest pain, difficulty breathing)        Denied  11. PREGNANCY: \"Is there any chance you are pregnant?\" \"When was your last menstrual period?\"        N/A    Protocols used: Leg Swelling and Edema-A-OH

## 2024-04-03 NOTE — TELEPHONE ENCOUNTER
S/w Crissy ( HIPAA authorized) daughter who is not with the patient and stated cannot reach the pt since he is at home alone without a phone. Daughter will contact the brother to call the office for first person triage.       Triage symptoms.

## 2024-04-03 NOTE — TELEPHONE ENCOUNTER
Daughter of pt is calling stating that pt has both feet swollen for about 2 weeks.      Please call and advise

## 2024-04-04 NOTE — DISCHARGE INSTRUCTIONS
Es probable que rahul síntomas estén relacionados con karly enfermedad vascular periférica. La hinchazón del pie puede estar relacionada con la rolando circulación y la forma en que tiene el pie hacia abajo en posición dependiente (sentado).  [place] se sugieren calcetines de compresión de venta anna. Los calcetines básicos de lactancia están noel siempre que tenga algo de compresión crónica. Asegúrate de que los use a ambos.  Tylenol de venta anna no existe ninguna contraindicación necesaria para el dolor.  Glicinato de magnesio sin receta, de 400 a 450 mg todas las noches summer las próximas semanas para matthias si esto ayuda. Es un relajante del músculo liso y puede provocar fatiga y somnolencia. No debería afectar la memoria. Mantenga bajas las precauciones contra caídas.  B12 sublingual de venta anna. [place] significa que se derrite en la boca. [place] puede ayudar con el dolor de los nervios. O puede obtener un complejo B12 de venta anna.  El relajante muscular robaxin/metocarbamol se envió a la farmacia según era necesario para los espasmos musculares.  Se enviaron parches de lidocaína a la farmacia. Aplicar por la noche, antes de acostarse puede usarse summer 24 horas. No te duches con él. Si usa el verano, no exponga los parches de lidocaína en la piel a la kacy solar directa. Puede causar sarpullido y ardor.    Your symptoms are likely related to peripheral vascular disease.  The swelling of the foot can be related to poor circulation, and the way he has his foot down in the dependent (sitting) position.   As suggested over-the-counter compression socks.  Basic nursing socks are fine as long as that he has some chronic compression.  Make sure that he wears both of them.  Over-the-counter Tylenol there is no contraindication as needed for pain.  Over-the-counter magnesium glycinate 400 to 450 mg every night for the next few weeks to see if this helps.  This is a smooth muscle relaxer, and can cause fatigue, and  drowsiness.  It should not affect memory.  Monitor fall precautions low.  Over-the-counter sublingual B12.  This means it melts in your mouth.  This can help out with nerve pain.  Or you can get an over-the-counter B12 complex.  Robaxin/methocarbamol muscle relaxer was sent to the pharmacy as needed for muscle spasms.  Lidocaine patches were sent to the pharmacy.  Apply in the evening time, before bedtime may be worn for 24 hours.  Do not shower with it.  If using the summertime do not expose lidocaine patches on the skin with direct sunlight.  May cause rash, and burning.  Go to the ER for any new or worsening symptoms.

## 2024-04-04 NOTE — ED INITIAL ASSESSMENT (HPI)
Pt with daughter who reports intermittent right foot and ankle swelling and lateral pain for past week that occurs in latter half of the day; minor right lower leg inflammation and anterior pain; denies CP, SOB or dizziness; pt started on Memantine 1.5 wks ago; hx Alzheimer's

## 2024-04-11 NOTE — TELEPHONE ENCOUNTER
I spoke to manager Felicitas who spoke to Miguelina.  Patient will need to be canceled.    Fax received from [facility] stating that [facility] is out of network.  It says that there are providers/facilities in network for the procedure codes that we requested and patient needs to be referred to in network facility.    I notified patient's daughter who is on verbal release.  She will let her father know that procedure is canceled, he can go ahead and eat, and that he should call number on insurance card to find out a location that is in network so he can get procedures set up.    Aware canceled.  Fearful they would get late cancel fee, I told him that this is out of control so he would not get a late cancel charge.    I told her if he has any questions he can call us and given our office number.    Removed from appointment desk and entered case change to cancel.     Coleen ID # [id number] used for entire call.

## 2024-04-24 NOTE — TELEPHONE ENCOUNTER
Fax received from [facility] with approval notice for pt Lacosamide 100 mg tablet. Medication is approved from 04/18/2024-04/18/2025. Copy sent to scanning for pt records.

## 2025-02-28 NOTE — ED PROVIDER NOTES
Patient Seen in: [facility] [place]      History     Chief Complaint   Patient presents with    Foot Swelling     Stated Complaint: swelling in right leg    Subjective:   HPI    .Felix Gonzales is a 68 year old male accompanied family who prefers to translate Greenlandic from here for right anterior leg pain, and right foot swelling.  Symptoms been on and off since has been diagnosed with peripheral vascular disease.  Other medical history includes but not limited to Alzheimer disease, diabetes, hypertension, high cholesterol, and seizure.  No recent travel, recent procedures, history of coagulation defect, or recent trauma.  No speech changes, or unilateral weakness.  Otherwise well-appearing, hemodynamically appropriate for presenting complaint.    Objective:   Past Medical History:   Diagnosis Date    Alzheimer disease (HCC)     Diabetes (HCC)     Essential hypertension     High cholesterol     Seizure (HCC)               History reviewed. No pertinent surgical history.             Social History     Socioeconomic History    Marital status:    Tobacco Use    Smoking status: Never    Smokeless tobacco: Never   Substance and Sexual Activity    Alcohol use: Not Currently    Drug use: Never              Review of Systems    Positive for stated complaint: swelling in right leg  Other systems are as noted in HPI.  Constitutional and vital signs reviewed.      All other systems reviewed and negative except as noted above.    Physical Exam     ED Triage Vitals [04/04/24 0828]   /58   Pulse 69   Resp 18   Temp 98.6 °F (37 °C)   Temp src Temporal   SpO2 100 %   O2 Device None (Room air)       Current:/58   Pulse 69   Temp 98.6 °F (37 °C) (Temporal)   Resp 18   SpO2 100%         Physical Exam  Constitutional:       Appearance: Normal appearance. He is not ill-appearing.   HENT:      Head: Normocephalic.   Eyes:      Pupils: Pupils are equal, round, and reactive to light.   Cardiovascular:       Rate and Rhythm: Normal rate.      Pulses: Normal pulses.   Musculoskeletal:         General: No deformity or signs of injury. Normal range of motion.      Cervical back: Normal range of motion.      Comments: No bony tenderness, and good active range of motion.  Good pulses.  Pitting to edema limited to right anterior foot.  Nontender to palpation.  Spider veins noted along with hyperpigmentation of skin consistent with peripheral vascular disease. Mild Decreased sensation compared to left foot.  No open wounds.  Compartments soft, NVI.    Skin:     Capillary Refill: Capillary refill takes less than 2 seconds.   Neurological:      General: No focal deficit present.      Mental Status: He is alert. Mental status is at baseline.      Motor: No weakness.   Psychiatric:         Mood and Affect: Mood normal.         Behavior: Behavior normal.         Thought Content: Thought content normal.         Judgment: Judgment normal.               ED Course   Labs Reviewed - No data to display                 MDM             Medical Decision Making  Differential diagnosis includes not limited to peripheral vascular disease/peripheral arterial disease, shingles, DVT, muscle spasm, sprain, stress fracture, or somatic causes of symptoms.    Symptoms consistent with peripheral vascular disease.No active cancer, bedridden, recent surgery, Swelling greater than 3 cm, recent immobilization, recent travel, localized tenderness along the deep venous system.  No evidence of compartment syndrome, or neurovascular compromise.  Home care reviewed that includes but not limited to compression stockings.  Will also treat for muscle spasm.  Discussed risk vs benefit of methocarbamol/Robaxin.   All questions answered, reinforced ER precautions, and follow-up care.  All questions answered, reassurance given.  No acute distress, cleared for discharge home.    Problems Addressed:  Anterior leg pain, right: acute illness or injury  Localized  swelling of right foot: acute illness or injury  Peripheral vascular disease (HCC): acute illness or injury  Spasm of muscle: acute illness or injury    Amount and/or Complexity of Data Reviewed  External Data Reviewed: radiology and notes.     Details: Ultrasound 9/21/2023 Independent interpretation. Reviewed with patient for peripheral vascular disease.  ARTURO of 0.8.    Risk  OTC drugs.  Prescription drug management.        Disposition and Plan     Clinical Impression:  1. Spasm of muscle    2. Peripheral vascular disease (HCC)    3. Anterior leg pain, right    4. Localized swelling of right foot         Disposition:  Discharge  4/4/2024  9:03 am    Follow-up:  Janet Zamorano MD  [address] [facility] [address]  [facility] [address]  [phone]                Medications Prescribed:  Current Discharge Medication List        START taking these medications    Details   lidocaine 5 % External Patch Apply patch in the evening time, before bed.  May be used every 24 hours.  Qty: 14 each, Refills: 0    Comments: NPI [id number].      methocarbamol 500 MG Oral Tab HALF to ONE full tablet 1 to 2 hours before bed as needed for muscle spasm. May take up to 2 tablets, but monitor for strict fall precautions  Qty: 10 tablet, Refills: 0    Comments: NPI [id number].                                             Nephrology-Cardiorenal

## 2025-06-16 NOTE — TELEPHONE ENCOUNTER
Requested Prescriptions     Pending Prescriptions Disp Refills    LACOSAMIDE 100 MG Oral Tab [Pharmacy Med Name: Lacosamide 100 MG Oral Tablet] 180 tablet 0     Sig: Take 1 tablet by mouth twice daily     [place]/; unable to verify    Last OV: 3/28/2024   Next OV:     Last office visit plan;    Assessment  1. Seizure disorder (HCC)  Episode of 1 episode of seizure, with abnormal EEG report with left temporal sharp wave activity.  Unclear if it is anything had anything to do with her COVID vaccination.  Possible epilepsy.  MRI of the brain was not revealing, however since Keppra might be causing some problems with anxiety and mood changes and we tried to treat him with Vimpat, however he continued with anxiety and depression.  Advised to follow-up with psychiatrist about that as well.  Will talk about possible different seizure medication but family decided to stick with Vimpat for now..        2. Hypersomnia  Some of his hypersomnia is probably from the obstructive sleep apnea, although relatively mild.  However some PLM also was present and therefore gabapentin will be started at night.  If is not successful, then he will go back to stocking for CPAP titration     3. MCI (mild cognitive impairment) with memory loss  Cognitive impairment with amnestic subtype, further work-up will be initiated including neuropsychological testing.  It seems that the care was taken over by the psychiatrist who is prescribing memantine at this point.  We talked about possible staging of Alzheimer's disease.     Advised to get neuropsychological testing done to get a better assessment.              Education and counseling provided to patient. Instructed patient to call my office or seek medical attention immediately if symptoms worsen.  Patient verbalized understanding of information given. All questions were answered. All side effects of drugs were discussed.         Return to clinic in: No follow-ups on file.     Joseluis Salazar,  MD              Instructions      Return in about 3 months (around 6/28/2024).

## (undated) NOTE — LETTER
January 2, 2024      No Recipients     Patient: Felix Gonzales   YOB: 1955   Date of Visit: 1/2/2024       Dear Dr. Deshpande Recipients:    Thank you for referring Felix Gonzales to me for evaluation. Here is my assessment and plan of care:    Felix Gonzales is a 68 year old male.    HPI:     HPI    NP is here for diabetic eye exam.  Pt denies any vision problems.  He is not sure when he last had an eye exam.    Pt has been a diabetic for 20 years       Pt's diabetes is currently controlled by pills  Pt checks BS does not check   Pt's last blood sugar was  43 on 10/30/23  Last HA1C was 6.2 on 11/16/23  Endocrinologist: none    Consult : Per Dr. Lobo Ashley  Last edited by Monique Ugarte, O.T. on 1/2/2024  3:09 PM.        Patient History:  Past Medical History:   Diagnosis Date    Diabetes (HCC)     Essential hypertension     High cholesterol     Seizure (HCC)        Surgical History: Felxi Gonzales has no past surgical history on file.    Family History   Problem Relation Age of Onset    Diabetes Mother     Diabetes Sister     Pancreatic Cancer Sister     Blood Cancer Sister     Diabetes Brother     Glaucoma Neg     Macular degeneration Neg        Social History:   Social History     Socioeconomic History    Marital status:    Tobacco Use    Smoking status: Never    Smokeless tobacco: Never   Substance and Sexual Activity    Alcohol use: Not Currently    Drug use: Never       Medications:  Current Outpatient Medications   Medication Sig Dispense Refill    lacosamide (VIMPAT) 100 MG Oral Tab Take 1 tablet (100 mg total) by mouth 2 (two) times daily. 180 tablet 3    gabapentin 300 MG Oral Cap Start with 1 pill QHS, for 1 week, then 2 pills qhs 180 capsule 3    donepezil 5 MG Oral Tab Take 1 tablet (5 mg total) by mouth nightly. 90 tablet 3    Cholecalciferol (VITAMIN D) 50 MCG (2000 UT) Oral Cap Take by mouth.      levETIRAcetam 500 MG Oral Tab Take 1  tablet (500 mg total) by mouth 2 (two) times daily. 180 tablet 3    polyethylene glycol, PEG 3350-KCl-NaBcb-NaCl-NaSulf, 236 g Oral Recon Soln Take 4,000 mL by mouth As Directed. Take 2,000 mL the night before your procedure and 2,000 mL the morning of your procedure. 1 each 0    atorvastatin 40 MG Oral Tab Take 1 tablet (40 mg total) by mouth daily. 90 tablet 1    enalapril 20 MG Oral Tab Take 1 tablet (20 mg total) by mouth daily. 90 tablet 1    hydroCHLOROthiazide 12.5 MG Oral Cap Take 1 capsule (12.5 mg total) by mouth daily. 90 capsule 1    metFORMIN HCl 1000 MG Oral Tab Take 1 tablet (1,000 mg total) by mouth 2 (two) times daily with meals. 180 tablet 1       Allergies:  No Known Allergies    ROS:     ROS    Positive for: Endocrine, Eyes  Negative for: Constitutional, Gastrointestinal, Neurological, Skin, Genitourinary, Musculoskeletal, HENT, Cardiovascular, Respiratory, Psychiatric, Allergic/Imm, Heme/Lymph  Last edited by Monique Ugarte, O.T. on 1/2/2024  2:58 PM.          PHYSICAL EXAM:     Base Eye Exam       Visual Acuity (Snellen - Linear)         Right Left    Dist sc 20/20 -1 20/20    Near sc 20/40 20/40              Tonometry (Icare, 3:07 PM)         Right Left    Pressure 19 20              Pupils         Pupils    Right PERRL    Left PERRL              Visual Fields         Left Right     Full Full              Extraocular Movement         Right Left     Full, Ortho Full, Ortho              Neuro/Psych       Oriented x3: Yes    Mood/Affect: Normal              Dilation       Both eyes: 1.0% Mydriacyl and 2.5% Negro Synephrine @ 3:07 PM                  Slit Lamp and Fundus Exam       Slit Lamp Exam         Right Left    Lids/Lashes Dermatochalasis, Meibomian gland dysfunction Dermatochalasis, Meibomian gland dysfunction    Conjunctiva/Sclera Nasal/temp pinguecula Nasal/temp pinguecula    Cornea Clear Clear    Anterior Chamber Deep and quiet Deep and quiet    Iris Normal Normal    Lens 1-2+ Nuclear  sclerosis 1-2+ Nuclear sclerosis    Vitreous Vitreous floaters Vitreous floaters              Fundus Exam         Right Left    Disc Good rim, Temporal crescent Good rim, Temporal crescent    C/D Ratio 0.6 0.5    Macula few MA's above fovea no CSME Normal-no BDR    Vessels Normal Normal    Periphery Normal Normal                  Refraction       Manifest Refraction (Auto)         Sphere Cylinder Axis    Right -0.25 +0.50 155    Left +0.50 Sphere    Declined refraction. Says he doesn't need them.                    ASSESSMENT/PLAN:     Diagnoses and Plan:     Nonproliferative diabetic retinopathy of right eye (HCC)  Diabetes type II: mild background diabetic retinopathy, no signs of neovascularization noted.  No treatment necessary at this time.  Patient was instructed to monitor vision for sudden changes and to call if visual changes noted.  Discussed ocular and systemic benefits of blood sugar control.    Will see patient in 1 year for a diabetic exam    Age-related nuclear cataract of both eyes  Discussed mild cataracts with patient.  No treatment recommended at this time.       No orders of the defined types were placed in this encounter.      Meds This Visit:  Requested Prescriptions      No prescriptions requested or ordered in this encounter        Follow up instructions:  Return in about 1 year (around 1/2/2025) for Diabetic eye exam.    1/2/2024  Scribed by: Landon Benz MD        If you have questions, please do not hesitate to call me. I look forward to following Felix along with you.    Sincerely,        Landon Benz MD        CC:   No Recipients    Document electronically generated by: Landon Benz MD